# Patient Record
Sex: MALE | Race: WHITE | Employment: FULL TIME | URBAN - NONMETROPOLITAN AREA
[De-identification: names, ages, dates, MRNs, and addresses within clinical notes are randomized per-mention and may not be internally consistent; named-entity substitution may affect disease eponyms.]

---

## 2017-01-31 ENCOUNTER — OFFICE VISIT (OUTPATIENT)
Dept: PRIMARY CARE CLINIC | Age: 22
End: 2017-01-31
Payer: COMMERCIAL

## 2017-01-31 VITALS
SYSTOLIC BLOOD PRESSURE: 110 MMHG | WEIGHT: 222 LBS | BODY MASS INDEX: 30.07 KG/M2 | DIASTOLIC BLOOD PRESSURE: 72 MMHG | HEIGHT: 72 IN | TEMPERATURE: 98.6 F | HEART RATE: 61 BPM | OXYGEN SATURATION: 98 %

## 2017-01-31 DIAGNOSIS — R09.81 NASAL CONGESTION: ICD-10-CM

## 2017-01-31 DIAGNOSIS — R05.9 COUGH: ICD-10-CM

## 2017-01-31 DIAGNOSIS — J01.00 ACUTE MAXILLARY SINUSITIS, RECURRENCE NOT SPECIFIED: Primary | ICD-10-CM

## 2017-01-31 LAB
INFLUENZA A ANTIBODY: NORMAL
INFLUENZA B ANTIBODY: NORMAL

## 2017-01-31 PROCEDURE — 99213 OFFICE O/P EST LOW 20 MIN: CPT | Performed by: NURSE PRACTITIONER

## 2017-01-31 PROCEDURE — 87804 INFLUENZA ASSAY W/OPTIC: CPT | Performed by: NURSE PRACTITIONER

## 2017-01-31 RX ORDER — FLUTICASONE PROPIONATE 50 MCG
1 SPRAY, SUSPENSION (ML) NASAL DAILY
Qty: 1 BOTTLE | Refills: 0 | Status: SHIPPED | OUTPATIENT
Start: 2017-01-31 | End: 2020-01-07

## 2017-01-31 RX ORDER — AMOXICILLIN AND CLAVULANATE POTASSIUM 875; 125 MG/1; MG/1
1 TABLET, FILM COATED ORAL 2 TIMES DAILY
Qty: 20 TABLET | Refills: 0 | Status: SHIPPED | OUTPATIENT
Start: 2017-01-31 | End: 2017-02-10

## 2017-01-31 ASSESSMENT — ENCOUNTER SYMPTOMS
BACK PAIN: 0
ABDOMINAL DISTENTION: 0
VOICE CHANGE: 0
NAUSEA: 0
VOMITING: 0
DIARRHEA: 0
COUGH: 1
ABDOMINAL PAIN: 0
CHOKING: 0
WHEEZING: 0
FACIAL SWELLING: 1
CONSTIPATION: 0
SINUS PRESSURE: 1
TROUBLE SWALLOWING: 0
SORE THROAT: 1
SHORTNESS OF BREATH: 0
CHEST TIGHTNESS: 0

## 2017-03-14 RX ORDER — OMEPRAZOLE 40 MG/1
40 CAPSULE, DELAYED RELEASE ORAL DAILY
Qty: 90 CAPSULE | Refills: 2 | Status: SHIPPED | OUTPATIENT
Start: 2017-03-14 | End: 2017-11-23 | Stop reason: SDUPTHER

## 2017-03-14 RX ORDER — OMEPRAZOLE 40 MG/1
40 CAPSULE, DELAYED RELEASE ORAL DAILY
Qty: 90 CAPSULE | Refills: 2 | Status: SHIPPED | OUTPATIENT
Start: 2017-03-14 | End: 2017-03-14 | Stop reason: SDUPTHER

## 2017-10-03 ENCOUNTER — TELEPHONE (OUTPATIENT)
Dept: PRIMARY CARE CLINIC | Age: 22
End: 2017-10-03

## 2017-10-03 ENCOUNTER — PROCEDURE VISIT (OUTPATIENT)
Dept: PRIMARY CARE CLINIC | Age: 22
End: 2017-10-03
Payer: COMMERCIAL

## 2017-10-03 DIAGNOSIS — R79.89 LOW TESTOSTERONE LEVEL IN MALE: ICD-10-CM

## 2017-10-03 DIAGNOSIS — R79.89 LOW TESTOSTERONE LEVEL IN MALE: Primary | ICD-10-CM

## 2017-10-03 DIAGNOSIS — E29.1 HYPOGONADISM IN MALE: Primary | ICD-10-CM

## 2017-10-03 LAB — TESTOSTERONE TOTAL: 413 NG/DL (ref 249–836)

## 2017-10-03 PROCEDURE — 96372 THER/PROPH/DIAG INJ SC/IM: CPT | Performed by: PEDIATRICS

## 2017-10-03 RX ORDER — TESTOSTERONE CYPIONATE 200 MG/ML
200 INJECTION INTRAMUSCULAR ONCE
Status: COMPLETED | OUTPATIENT
Start: 2017-10-03 | End: 2017-10-03

## 2017-10-03 RX ORDER — TESTOSTERONE CYPIONATE 200 MG/ML
200 INJECTION INTRAMUSCULAR
Qty: 10 ML | Refills: 0 | Status: SHIPPED | OUTPATIENT
Start: 2017-10-03 | End: 2019-01-11 | Stop reason: SDUPTHER

## 2017-10-03 RX ADMIN — TESTOSTERONE CYPIONATE 200 MG: 200 INJECTION INTRAMUSCULAR at 09:25

## 2017-10-03 NOTE — PROGRESS NOTES
After obtaining consent, and per orders of Dr. Burnett , injection of 200mg testosterone given in Left upper quad. gluteus by Oleg Tabor. Patient tolerated well.

## 2017-10-03 NOTE — PROGRESS NOTES
Pt needs to have cbc, cmp and testosterone level checked.    This needs to be drawn as close to 8am as possible 1/2 way thru injections

## 2017-10-03 NOTE — TELEPHONE ENCOUNTER
Left message on pt voicemail and spoke with Gudelia Cook.  She said that pt had them drawn for work recently and that she would bring the results in when she come back for vacation

## 2017-10-03 NOTE — TELEPHONE ENCOUNTER
----- Message from GIANCARLO Interiano sent at 10/3/2017 12:59 PM CDT -----  Please call patient and let them know results. Continue current testosterone dosage. Need to have CBC and CMP drawn as well. Please check and see if these can be added  If not patient needs to return for a CBC and CMP.

## 2017-10-03 NOTE — TELEPHONE ENCOUNTER
Requested Prescriptions     Pending Prescriptions Disp Refills    testosterone cypionate (DEPOTESTOTERONE CYPIONATE) 200 MG/ML injection 10 mL 0     Sig: Inject 1 mL into the muscle every 30 days Please include syringes and needles

## 2017-11-27 RX ORDER — OMEPRAZOLE 40 MG/1
40 CAPSULE, DELAYED RELEASE ORAL DAILY
Qty: 90 CAPSULE | Refills: 3 | Status: SHIPPED | OUTPATIENT
Start: 2017-11-27 | End: 2018-06-08 | Stop reason: SDUPTHER

## 2017-11-27 NOTE — TELEPHONE ENCOUNTER
Pt last seen 1/31/17      Requested Prescriptions     Signed Prescriptions Disp Refills    omeprazole (PRILOSEC) 40 MG delayed release capsule 90 capsule 3     Sig: Take 1 capsule by mouth daily     Authorizing Provider: Frandy Russo     Ordering User: ALFREDO WILKERSON

## 2018-06-08 RX ORDER — OMEPRAZOLE 40 MG/1
40 CAPSULE, DELAYED RELEASE ORAL DAILY
Qty: 90 CAPSULE | Refills: 3 | Status: SHIPPED | OUTPATIENT
Start: 2018-06-08 | End: 2019-06-06 | Stop reason: SDUPTHER

## 2018-08-27 ENCOUNTER — PROCEDURE VISIT (OUTPATIENT)
Dept: PRIMARY CARE CLINIC | Age: 23
End: 2018-08-27
Payer: COMMERCIAL

## 2018-08-27 DIAGNOSIS — Z23 NEED FOR TETANUS BOOSTER: Primary | ICD-10-CM

## 2018-08-27 PROCEDURE — 90471 IMMUNIZATION ADMIN: CPT | Performed by: PEDIATRICS

## 2018-08-27 PROCEDURE — 90715 TDAP VACCINE 7 YRS/> IM: CPT | Performed by: PEDIATRICS

## 2019-01-03 DIAGNOSIS — E29.1 HYPOGONADISM IN MALE: Primary | ICD-10-CM

## 2019-01-09 LAB
TESTOSTERONE FREE: 9.3
TESTOSTERONE TOTAL: 432

## 2019-01-10 ENCOUNTER — TELEPHONE (OUTPATIENT)
Dept: PRIMARY CARE CLINIC | Age: 24
End: 2019-01-10

## 2019-01-11 DIAGNOSIS — R79.89 LOW TESTOSTERONE IN MALE: Primary | ICD-10-CM

## 2019-01-11 RX ORDER — TESTOSTERONE CYPIONATE 200 MG/ML
200 INJECTION INTRAMUSCULAR
Qty: 1 ML | Refills: 0 | Status: SHIPPED | OUTPATIENT
Start: 2019-01-11 | End: 2019-02-06 | Stop reason: SDUPTHER

## 2019-02-06 DIAGNOSIS — R79.89 LOW TESTOSTERONE IN MALE: ICD-10-CM

## 2019-02-06 RX ORDER — NITROFURANTOIN 25; 75 MG/1; MG/1
100 CAPSULE ORAL 2 TIMES DAILY
Qty: 14 CAPSULE | Refills: 0 | Status: SHIPPED | OUTPATIENT
Start: 2019-02-06 | End: 2019-02-13

## 2019-02-06 RX ORDER — TESTOSTERONE CYPIONATE 200 MG/ML
200 INJECTION INTRAMUSCULAR
Qty: 10 ML | Refills: 0 | Status: SHIPPED | OUTPATIENT
Start: 2019-02-06 | End: 2020-01-07

## 2019-02-07 RX ORDER — PHENAZOPYRIDINE HYDROCHLORIDE 200 MG/1
200 TABLET, FILM COATED ORAL 3 TIMES DAILY PRN
Qty: 12 TABLET | Refills: 0 | Status: SHIPPED | OUTPATIENT
Start: 2019-02-07 | End: 2019-02-11

## 2019-06-06 RX ORDER — OMEPRAZOLE 40 MG/1
40 CAPSULE, DELAYED RELEASE ORAL DAILY
Qty: 90 CAPSULE | Refills: 3 | Status: SHIPPED | OUTPATIENT
Start: 2019-06-06 | End: 2020-05-26

## 2019-09-06 ENCOUNTER — TELEPHONE (OUTPATIENT)
Dept: PRIMARY CARE CLINIC | Age: 24
End: 2019-09-06

## 2019-09-06 DIAGNOSIS — R79.89 LOW TESTOSTERONE IN MALE: Primary | ICD-10-CM

## 2019-09-06 DIAGNOSIS — E29.1 HYPOGONADISM IN MALE: ICD-10-CM

## 2019-09-07 ENCOUNTER — HOSPITAL ENCOUNTER (OUTPATIENT)
Dept: LAB | Age: 24
Discharge: HOME | End: 2019-09-07
Payer: COMMERCIAL

## 2019-09-07 DIAGNOSIS — R79.89 LOW TESTOSTERONE IN MALE: ICD-10-CM

## 2019-09-07 DIAGNOSIS — E29.1 HYPOGONADISM IN MALE: ICD-10-CM

## 2019-09-07 LAB — TESTOSTERONE TOTAL: 378.2 NG/DL (ref 249–836)

## 2019-09-10 ENCOUNTER — TELEPHONE (OUTPATIENT)
Dept: PRIMARY CARE CLINIC | Age: 24
End: 2019-09-10

## 2020-01-07 ENCOUNTER — OFFICE VISIT (OUTPATIENT)
Dept: PRIMARY CARE CLINIC | Age: 25
End: 2020-01-07
Payer: COMMERCIAL

## 2020-01-07 VITALS
HEART RATE: 62 BPM | HEIGHT: 72 IN | DIASTOLIC BLOOD PRESSURE: 82 MMHG | TEMPERATURE: 98.6 F | SYSTOLIC BLOOD PRESSURE: 124 MMHG | OXYGEN SATURATION: 98 % | WEIGHT: 172 LBS | BODY MASS INDEX: 23.3 KG/M2

## 2020-01-07 PROCEDURE — 99213 OFFICE O/P EST LOW 20 MIN: CPT | Performed by: NURSE PRACTITIONER

## 2020-01-07 RX ORDER — TESTOSTERONE CYPIONATE 200 MG/ML
200 INJECTION INTRAMUSCULAR
Qty: 1 ML | Refills: 5 | Status: SHIPPED | OUTPATIENT
Start: 2020-01-07 | End: 2020-07-13

## 2020-01-07 ASSESSMENT — ENCOUNTER SYMPTOMS
SORE THROAT: 0
WHEEZING: 0
RHINORRHEA: 0
SHORTNESS OF BREATH: 0
ABDOMINAL PAIN: 0
BACK PAIN: 0
COUGH: 0
EYES NEGATIVE: 1

## 2020-01-07 NOTE — PATIENT INSTRUCTIONS
seek immediate medical care if:    · You have headaches.     · You have problems with your vision.    Watch closely for changes in your health, and be sure to contact your doctor if:    · You have trouble getting or keeping an erection.     · You have a loss of body hair.     · You feel weak or tired a lot of the time.     · Your breasts are getting larger.     · You do not get better as expected. Where can you learn more? Go to https://ADVENTRX PharmaceuticalspeniraliPicPrizeseb.Grivy. org and sign in to your JustGo account. Enter [id number] in the Gasngo box to learn more about \"Hypogonadism: Care Instructions. \"     If you do not have an account, please click on the \"Sign Up Now\" link. Current as of: November 6, 2018  Content Version: 12.1  © 2298-7524 Healthwise, Incorporated. Care instructions adapted under license by [facility] ([facility]). If you have questions about a medical condition or this instruction, always ask your healthcare professional. [place] [address] any warranty or liability for your use of this information.

## 2020-01-07 NOTE — PROGRESS NOTES
[address]  [place], [address] [place] [address] Rd  Phone [phone]   Fax [phone]      OFFICE VISIT: 2020    Ajay Michaeliff- : 1995      Reason For Visit:  Huong Santoro is a 21 y.o. Lily Dock is here for Referral - General         Health Maintenance    HPI      Patient is here for follow up on low testosterone  He reports he has been trying to get a baby  Has an issue with low testosterone  And he is concerned   He hasnt been getting his shots    He is worried about taking it   And worried about checking it  As reason       height is 6' (1.829 m) and weight is 172 lb (78 kg). His temporal temperature is 98.6 °F (37 °C). His blood pressure is 124/82 and his pulse is 62. His oxygen saturation is 98%. Body mass index is 23.33 kg/m². Results for orders placed or performed in visit on 19   Testosterone   Result Value Ref Range    Testosterone 378.2 249.0 - 836.0 ng/dL       I have reviewed the following with the Mr. Aguilar Knee   Lab Review   Orders Only on 2019   Component Date Value    Testosterone 2019 378.2      Copies of these are in the chart. Prior to Visit Medications    Medication Sig Taking? Authorizing Provider   omeprazole (PRILOSEC) 40 MG delayed release capsule Take 1 capsule by mouth daily Yes GIANCARLO Kumari       Allergies: Percocet [oxycodone-acetaminophen]    Past Medical History:   Diagnosis Date    Asthma     Has outgrown it    Vision abnormalities     wears glasses/contacts       Past Surgical History:   Procedure Laterality Date    CYST REMOVAL      on the facial area    SHOULDER SURGERY      Labrum repair    TYMPANOSTOMY TUBE PLACEMENT         Social History     Tobacco Use    Smoking status: Never Smoker    Smokeless tobacco: Former User   Substance Use Topics    Alcohol use: Yes     Comment: rare       Review of Systems   Constitutional: Positive for activity change and fatigue. Negative for appetite change and fever.    HENT: Negative for Octavia Phillip, UrologySilvio      Orders Placed This Encounter   Procedures   [facility] Octavia Galeas, UrologyAtilio        Return if symptoms worsen or fail to improve. Patient Instructions       Patient Education        Hypogonadism: Care Instructions  Your Care Instructions    Men who have hypogonadism do not make enough testosterone. This hormone allows men to make sperm and to have normal physical male traits. The condition also is known as testosterone deficiency. It can lead to loss of sex drive, weakness, impotence, infertility, and weakened bones. Many things can cause this condition. Causes include injured testicles, certain medicines, an infection, and aging. Having a long-term health problem such as kidney or liver disease or being obese can cause it. So can surgery or radiation treatment for another health problem. It also can be present at birth. It is most often treated with testosterone hormone. You can get the hormone as a shot or through a patch or gel on the skin. Follow-up care is a key part of your treatment and safety. Be sure to make and go to all appointments, and call your doctor if you are having problems. It's also a good idea to know your test results and keep a list of the medicines you take. How can you care for yourself at home? · Take your medicines exactly as prescribed. Call your doctor if you think you are having a problem with your medicine. You will get more details on the specific medicines your doctor prescribes. · Follow your treatment plan. If you use testosterone hormones, follow your doctor's instructions. Hormones can help relieve many of the effects of this condition, such as impotence. But it may take weeks or months for your symptoms to improve. · Get plenty of exercise. And make sure to get plenty of calcium and vitamin D in your diet. Eat more dairy foods and green vegetables. They can help keep your bones from getting weak.   · If you have a hard time dealing with this condition, talk to your doctor about joining a support group. Talking with others who have the same problems can help you cope. When should you call for help? Call your doctor now or seek immediate medical care if:    · You have headaches.     · You have problems with your vision.    Watch closely for changes in your health, and be sure to contact your doctor if:    · You have trouble getting or keeping an erection.     · You have a loss of body hair.     · You feel weak or tired a lot of the time.     · Your breasts are getting larger.     · You do not get better as expected. Where can you learn more? Go to https://GlucoVistapeMumart.Crumpet Cashmere. org and sign in to your [employer] account. Enter [id number] in the Yelp box to learn more about \"Hypogonadism: Care Instructions. \"     If you do not have an account, please click on the \"Sign Up Now\" link. Current as of: November 6, 2018  Content Version: 12.1  © 4336-1090 Healthwise, Incorporated. Care instructions adapted under license by [facility] ([facility]). If you have questions about a medical condition or this instruction, always ask your healthcare professional. [address] any warranty or liability for your use of this information. Controlled Substances Monitoring: Additional Instructions: As always, patient is advisedto bring in medication bottles in order to correctly reconcile with our current list.      Ralph Jorge received counseling on the following healthy behaviors: none    Patient giveneducational materials on plan of care    I have instructed Ralph Jorge to complete a self tracking handout on none and instructed them to bring it with them to his next appointment. Discussed use, benefit, and side effects of prescribed medications. Barriers to medication compliance addressed. All patient questions answered. Pt voiced understanding.      GIANCARLO Granda

## 2020-01-23 ENCOUNTER — OFFICE VISIT (OUTPATIENT)
Dept: UROLOGY | Age: 25
End: 2020-01-23
Payer: COMMERCIAL

## 2020-01-23 ENCOUNTER — TRANSCRIBE ORDERS (OUTPATIENT)
Dept: ADMINISTRATIVE | Facility: HOSPITAL | Age: 25
End: 2020-01-23

## 2020-01-23 VITALS
BODY MASS INDEX: 23.8 KG/M2 | HEART RATE: 59 BPM | HEIGHT: 71 IN | SYSTOLIC BLOOD PRESSURE: 143 MMHG | WEIGHT: 170 LBS | TEMPERATURE: 98.1 F | DIASTOLIC BLOOD PRESSURE: 81 MMHG

## 2020-01-23 DIAGNOSIS — Z87.42 HISTORY OF INFERTILITY: Primary | ICD-10-CM

## 2020-01-23 PROCEDURE — 99212 OFFICE O/P EST SF 10 MIN: CPT | Performed by: PHYSICIAN ASSISTANT

## 2020-01-23 ASSESSMENT — ENCOUNTER SYMPTOMS
DIARRHEA: 0
SHORTNESS OF BREATH: 0
WHEEZING: 0
CONSTIPATION: 0
EYE REDNESS: 0
ABDOMINAL PAIN: 0
BACK PAIN: 0
EYE DISCHARGE: 0
COUGH: 0

## 2020-01-23 NOTE — PROGRESS NOTES
Garret Mccormick is a 21 y.o. male who presents today   Chief Complaint   Patient presents with    New Patient     I am here through referral for hypogonadism      HPI:  Patient is a 79-year-old gentleman referred for hypopneic gonad is him and also infertility. Patient had been on IM testosterone replacement for several years he had a period about 9 months where he went off testosterone replacement and increased his exercise and lost some weight and his natural testosterone did increase. However more recently his testosterone levels have dropped and he is back on testosterone replacement. He is they have been trying to conceive for about 10 months without success. Patient does understand the relationship between testosterone replacement and infertility or low sperm count. Past Medical History:   Diagnosis Date    Asthma     Has outgrown it    Vision abnormalities     wears glasses/contacts       Past Surgical History:   Procedure Laterality Date    CYST REMOVAL      on the facial area    SHOULDER SURGERY      Labrum repair    TYMPANOSTOMY TUBE PLACEMENT  1995       Current Outpatient Medications   Medication Sig Dispense Refill    testosterone cypionate (DEPOTESTOTERONE CYPIONATE) 200 MG/ML injection Inject 1 mL into the muscle every 30 days for 30 doses. 1 mL 5    omeprazole (PRILOSEC) 40 MG delayed release capsule Take 1 capsule by mouth daily 90 capsule 3     No current facility-administered medications for this visit.         Allergies   Allergen Reactions    Percocet [Oxycodone-Acetaminophen] Nausea And Vomiting          Social History     Socioeconomic History    Marital status:      Spouse name: None    Number of children: None    Years of education: None    Highest education level: None   Occupational History    None   Social Needs    Financial resource strain: None    Food insecurity:     Worry: None     Inability: None    Transportation needs:     Medical: None Non-medical: None   Tobacco Use    Smoking status: Never Smoker    Smokeless tobacco: Former User   Substance and Sexual Activity    Alcohol use: Yes     Comment: rare    Drug use: No    Sexual activity: None   Lifestyle    Physical activity:     Days per week: None     Minutes per session: None    Stress: None   Relationships    Social connections:     Talks on phone: None     Gets together: None     Attends Holiness service: None     Active member of club or organization: None     Attends meetings of clubs or organizations: None     Relationship status: None    Intimate partner violence:     Fear of current or ex partner: None     Emotionally abused: None     Physically abused: None     Forced sexual activity: None   Other Topics Concern    None   Social History Narrative    None       Family History   Problem Relation Age of Onset    High Blood Pressure Mother     High Blood Pressure Maternal Grandmother     Diabetes Maternal Grandmother     High Cholesterol Maternal Grandfather     High Blood Pressure Maternal Grandfather     Diabetes Maternal Grandfather     Cancer Paternal Grandmother         Pancreatic     High Blood Pressure Paternal Grandmother        REVIEW OF SYSTEMS:  Review of Systems   Constitutional: Negative for chills and fever. HENT: Negative for congestion and hearing loss. Eyes: Negative for discharge and redness. Respiratory: Negative for cough, shortness of breath and wheezing. Cardiovascular: Negative for leg swelling. Gastrointestinal: Negative for abdominal pain, constipation and diarrhea. Endocrine: Negative for cold intolerance and heat intolerance. Genitourinary: Negative for decreased urine volume, difficulty urinating, dysuria, enuresis, flank pain, frequency, genital sores, hematuria and urgency. Musculoskeletal: Negative for back pain and gait problem. Skin: Negative for rash. Allergic/Immunologic: Negative for environmental allergies. Neurological: Negative for dizziness, weakness and headaches. Hematological: Does not bruise/bleed easily. Psychiatric/Behavioral: Negative for behavioral problems, confusion and sleep disturbance. PHYSICAL EXAM:  BP (!) 143/81   Pulse 59   Temp 98.1 °F (36.7 °C) (Temporal)   Ht 5' 11\" (1.803 m)   Wt 170 lb (77.1 kg)   BMI 23.71 kg/m²   Physical Exam  Vitals signs and nursing note reviewed. Constitutional:       Appearance: Normal appearance. HENT:      Head: Normocephalic. Nose: Nose normal.   Eyes:      Conjunctiva/sclera: Conjunctivae normal.   Cardiovascular:      Pulses: Normal pulses. Pulmonary:      Effort: Pulmonary effort is normal.   Genitourinary:     Penis: Normal.        Neurological:      Mental Status: He is alert and oriented to person, place, and time. Psychiatric:         Mood and Affect: Mood normal.         Behavior: Behavior normal.                       1. Hypogonadism in male  Has history of hypogonadism for several years has been on IM testosterone for now this has been stopped due to it causing infertility.  - Ultrasound scrotum and testicles; Future    2. History of infertility  Patient states they have been trying to conceive for approximately 10 months without success I did explain the relationship between testosterone replacement and risk of low sperm count. In some cases sperm count can increase after stopping testosterone replacement however in some cases it is irreversible. I explained that a semen analysis needs to be done I ordered but was told the lab no longer does the semen analysis I had explained to the patient that he would need to be referred to infertility for further evaluation we will make referral to infertility clinic in [place]. I would like him to return with a scrotal ultrasound just to rule out any anatomical abnormalities. - SEMEN ANALYSIS; Future  - Ultrasound scrotum and testicles;  Future      Orders Placed This Encounter Procedures    Ultrasound scrotum and testicles     Standing Status:   Future     Standing Expiration Date:   1/23/2021     Order Specific Question:   Reason for exam:     Answer:   History of infertility hypogonadism     No orders of the defined types were placed in this encounter.       Plan:  Follow up in 2 weeks with scrotal US

## 2020-01-27 ENCOUNTER — APPOINTMENT (OUTPATIENT)
Dept: LAB | Facility: HOSPITAL | Age: 25
End: 2020-01-27

## 2020-01-27 ENCOUNTER — TELEPHONE (OUTPATIENT)
Dept: UROLOGY | Age: 25
End: 2020-01-27

## 2020-01-27 LAB
CHARACTER SMN: NORMAL
COLLECTION METHOD SMN: NORMAL
EPI CELLS #/AREA SMN HPF: NORMAL /HPF
LIQUEFACTION TIME SMN: NORMAL MIN
NON PROGRESSIVE MOTILITY: 7.4 %
RBC #/AREA SMN HPF: NORMAL /HPF
SEX ABSTIN DURATION TIME PATIENT: 15 DAYS
SLOW PROGRESSIVE MOTILITY: 25.1 %
SMI: 381 (ref 80–400)
SPEC CONTAINER SPEC: NORMAL
SPECIMEN VOL SMN: 3.3 ML
SPERM # SMN: 178.7 MILLIONS/ML
SPERM IMMOTILE NFR SMN: 39.7 %
SPERM MOTILE NFR SMN: 60.3 % MOTILE (ref 50–100)
SPERM PROG NFR SMN: 27.8 % (ref 25–100)
SPERM SMN: 19.7 % NORMAL (ref 15–100)
VISC SMN: NORMAL CP
WBC SMN QL: NORMAL /HPF
WHO STANDARD: NORMAL

## 2020-01-27 PROCEDURE — 89320 SEMEN ANAL VOL/COUNT/MOT: CPT | Performed by: PHYSICIAN ASSISTANT

## 2020-01-27 NOTE — TELEPHONE ENCOUNTER
Called clinic for referral. Pt does not need a referral. He is to have his wife to call for appt so both can been go. Called pt. He is going to [address] [place] [address] lab for sperm count. He will come back for his follow up and go from there.

## 2020-02-06 ENCOUNTER — OFFICE VISIT (OUTPATIENT)
Dept: UROLOGY | Age: 25
End: 2020-02-06
Payer: COMMERCIAL

## 2020-02-06 ENCOUNTER — HOSPITAL ENCOUNTER (OUTPATIENT)
Dept: ULTRASOUND IMAGING | Age: 25
Discharge: HOME | End: 2020-02-06
Payer: COMMERCIAL

## 2020-02-06 VITALS
BODY MASS INDEX: 23.52 KG/M2 | DIASTOLIC BLOOD PRESSURE: 71 MMHG | WEIGHT: 168 LBS | SYSTOLIC BLOOD PRESSURE: 129 MMHG | HEART RATE: 60 BPM | HEIGHT: 71 IN | TEMPERATURE: 97.9 F

## 2020-02-06 LAB
APPEARANCE FLUID: CLEAR
BILIRUBIN, POC: NORMAL
BLOOD URINE, POC: NORMAL
CLARITY, POC: CLEAR
COLOR, POC: YELLOW
GLUCOSE URINE, POC: NORMAL
KETONES, POC: NORMAL
LEUKOCYTE EST, POC: NORMAL
NITRITE, POC: NORMAL
PH, POC: 7.5
PROTEIN, POC: NORMAL
SPECIFIC GRAVITY, POC: 1.02
UROBILINOGEN, POC: 0.2

## 2020-02-06 PROCEDURE — 76870 US EXAM SCROTUM: CPT

## 2020-02-06 PROCEDURE — 99213 OFFICE O/P EST LOW 20 MIN: CPT | Performed by: PHYSICIAN ASSISTANT

## 2020-02-06 PROCEDURE — 81002 URINALYSIS NONAUTO W/O SCOPE: CPT | Performed by: PHYSICIAN ASSISTANT

## 2020-02-06 ASSESSMENT — ENCOUNTER SYMPTOMS
CONSTIPATION: 0
EYE REDNESS: 0
COUGH: 0
ABDOMINAL PAIN: 0
EYE DISCHARGE: 0
SHORTNESS OF BREATH: 0
DIARRHEA: 0
WHEEZING: 0
BACK PAIN: 0

## 2020-02-06 NOTE — PROGRESS NOTES
gait problem. Skin: Negative for rash. Allergic/Immunologic: Negative for environmental allergies. Neurological: Negative for dizziness, weakness and headaches. Hematological: Does not bruise/bleed easily. Psychiatric/Behavioral: Negative for behavioral problems, confusion and sleep disturbance. I have reviewed and agree with the documentation provided by the medical assistant for this patient for today's visit. PHYSICAL EXAM:  /71   Pulse 60   Temp 97.9 °F (36.6 °C) (Temporal)   Ht 5' 11\" (1.803 m)   Wt 168 lb (76.2 kg)   BMI 23.43 kg/m²   Physical Exam  Vitals signs and nursing note reviewed. Constitutional:       Appearance: Normal appearance. HENT:      Head: Normocephalic and atraumatic. Nose: Nose normal.   Eyes:      Conjunctiva/sclera: Conjunctivae normal.   Cardiovascular:      Rate and Rhythm: Normal rate. Pulmonary:      Effort: Pulmonary effort is normal.   Neurological:      Mental Status: He is alert and oriented to person, place, and time. Psychiatric:         Mood and Affect: Mood normal.         Behavior: Behavior normal.             DATA:  U/A:    Lab Results   Component Value Date    NITRITE neg 02/06/2020    COLORU yellow 02/06/2020    PROTEINU neg 02/06/2020    PHUR 7.5 02/06/2020    CLARITYU clear 02/06/2020    SPECGRAV 1.020 02/06/2020    LEUKOCYTESUR neg 02/06/2020    BILIRUBINUR neg 02/06/2020    BLOODU neg 02/06/2020    GLUCOSEU neg 02/06/2020          Imaging:  Normal testes with a normal blood flow. No intrinsic   abnormality. A small left epididymal cyst.   Small bilateral hydrocele. A left varicocele within negative Valsalva. Signed by Dr Amena Ochoa on 2/6/2020 3:48 PM         1. Male infertility, unspecified  I reviewed patient's semen analysis done at [facility].  His sperm count is well within normal range there is 60% motility. There is below normal morphology.   Scrotal ultrasound reveals minor findings does reveal a left varicocele which is small however given the semen analysis findings unlikely this is the source of the infertility. After discussion we will refer patient to fertility specialist in [place] feel both himself and his wife need to be further evaluated. - POCT Urinalysis no Micro      Orders Placed This Encounter   Procedures    POCT Urinalysis no Micro     No orders of the defined types were placed in this encounter. Plan:  Patient will be referred to fertility specialist in [place].

## 2020-05-26 RX ORDER — OMEPRAZOLE 40 MG/1
40 CAPSULE, DELAYED RELEASE ORAL DAILY
Qty: 90 CAPSULE | Refills: 1 | Status: SHIPPED | OUTPATIENT
Start: 2020-05-26 | End: 2020-11-30

## 2020-07-13 RX ORDER — TESTOSTERONE CYPIONATE 200 MG/ML
200 INJECTION INTRAMUSCULAR
Qty: 1 ML | Refills: 5 | Status: SHIPPED | OUTPATIENT
Start: 2020-07-13 | End: 2021-04-29 | Stop reason: SDUPTHER

## 2020-11-10 PROCEDURE — 90471 IMMUNIZATION ADMIN: CPT | Performed by: NURSE PRACTITIONER

## 2020-11-10 PROCEDURE — 90715 TDAP VACCINE 7 YRS/> IM: CPT | Performed by: NURSE PRACTITIONER

## 2020-11-30 RX ORDER — OMEPRAZOLE 40 MG/1
CAPSULE, DELAYED RELEASE ORAL
Qty: 90 CAPSULE | Refills: 1 | Status: SHIPPED | OUTPATIENT
Start: 2020-11-30 | End: 2021-05-24

## 2020-11-30 NOTE — TELEPHONE ENCOUNTER
Received fax from pharmacy requesting refill on pts medication(s). Pt was last seen in office on 1/7/2020  and has a follow up scheduled for Visit date not found. Will send request to  Cassie Maharaj  for authorization.      Requested Prescriptions     Pending Prescriptions Disp Refills    omeprazole (PRILOSEC) 40 MG delayed release capsule [Pharmacy Med Name: OMEPRAZOLE DR 40 MG CAPSULE] 90 capsule 1     Sig: TAKE 1 CAPSULE BY MOUTH EVERY DAY

## 2021-03-02 DIAGNOSIS — R30.0 DYSURIA: Primary | ICD-10-CM

## 2021-03-02 RX ORDER — TAMSULOSIN HYDROCHLORIDE 0.4 MG/1
0.4 CAPSULE ORAL DAILY
Qty: 30 CAPSULE | Refills: 5 | Status: SHIPPED | OUTPATIENT
Start: 2021-03-02 | End: 2021-08-25

## 2021-03-02 RX ORDER — PHENAZOPYRIDINE HYDROCHLORIDE 200 MG/1
200 TABLET, FILM COATED ORAL 3 TIMES DAILY PRN
Qty: 9 TABLET | Refills: 0 | Status: SHIPPED | OUTPATIENT
Start: 2021-03-02 | End: 2021-03-05

## 2021-03-23 DIAGNOSIS — E29.1 HYPOGONADISM IN MALE: Primary | ICD-10-CM

## 2021-03-23 DIAGNOSIS — E29.1 HYPOGONADISM IN MALE: ICD-10-CM

## 2021-03-26 ENCOUNTER — TELEPHONE (OUTPATIENT)
Dept: PRIMARY CARE CLINIC | Age: 26
End: 2021-03-26

## 2021-03-26 LAB
SEX HORMONE BINDING GLOBULIN: 43 NMOL/L (ref 11–80)
TESTOSTERONE FREE-NONMALE: 87 PG/ML (ref 47–244)
TESTOSTERONE TOTAL: 491 NG/DL (ref 220–1000)

## 2021-03-26 NOTE — TELEPHONE ENCOUNTER
Called patient and left message with provider instructions on voicmail. Should patient have any questions or concerns, patient is to contact office at [phone].

## 2021-03-26 NOTE — TELEPHONE ENCOUNTER
----- Message from GIANCARLO Coto sent at 3/26/2021  3:26 PM CDT -----  Please call patient and let them know results.    Normal testosterone

## 2021-03-31 ENCOUNTER — TELEPHONE (OUTPATIENT)
Dept: PRIMARY CARE CLINIC | Age: 26
End: 2021-03-31

## 2021-03-31 DIAGNOSIS — R68.89 COLD FEELING: ICD-10-CM

## 2021-03-31 DIAGNOSIS — K59.00 CONSTIPATION, UNSPECIFIED CONSTIPATION TYPE: ICD-10-CM

## 2021-03-31 DIAGNOSIS — R53.83 FATIGUE, UNSPECIFIED TYPE: ICD-10-CM

## 2021-03-31 DIAGNOSIS — R53.83 FATIGUE, UNSPECIFIED TYPE: Primary | ICD-10-CM

## 2021-03-31 LAB
T4 FREE: 1.37 NG/DL (ref 0.93–1.7)
TSH SERPL DL<=0.05 MIU/L-ACNC: 1.56 UIU/ML (ref 0.27–4.2)

## 2021-04-01 ENCOUNTER — TELEPHONE (OUTPATIENT)
Dept: PRIMARY CARE CLINIC | Age: 26
End: 2021-04-01

## 2021-04-01 NOTE — TELEPHONE ENCOUNTER
Called patient, spoke with: Patient regarding the results of the patients most recent labs. I advised Patient of Juan F Mariel recommendations.    Patient did voice understanding

## 2021-04-01 NOTE — TELEPHONE ENCOUNTER
----- Message from GIANCARLO Crenshaw sent at 4/1/2021  7:00 AM CDT -----  Please call patient and let them know results.    Normal thyroid

## 2021-04-29 DIAGNOSIS — E29.1 HYPOGONADISM IN MALE: ICD-10-CM

## 2021-04-29 RX ORDER — TESTOSTERONE CYPIONATE 200 MG/ML
200 INJECTION INTRAMUSCULAR
Qty: 1 ML | Refills: 1 | Status: SHIPPED | OUTPATIENT
Start: 2021-04-29 | End: 2021-09-13 | Stop reason: SDUPTHER

## 2021-05-24 RX ORDER — OMEPRAZOLE 40 MG/1
40 CAPSULE, DELAYED RELEASE ORAL DAILY
Qty: 30 CAPSULE | Refills: 0 | Status: SHIPPED | OUTPATIENT
Start: 2021-05-24 | End: 2021-06-23

## 2021-06-23 RX ORDER — OMEPRAZOLE 40 MG/1
40 CAPSULE, DELAYED RELEASE ORAL DAILY
Qty: 30 CAPSULE | Refills: 0 | Status: SHIPPED | OUTPATIENT
Start: 2021-06-23 | End: 2021-07-27 | Stop reason: SDUPTHER

## 2021-06-23 NOTE — TELEPHONE ENCOUNTER
Received fax from pharmacy requesting refill on pts medication(s). Pt was last seen in office on 1/7/2020  and has a follow up scheduled for Visit date not found. Will send request to  Chikis Dejesus  for patient.      Requested Prescriptions     Pending Prescriptions Disp Refills    omeprazole (PRILOSEC) 40 MG delayed release capsule [Pharmacy Med Name: OMEPRAZOLE DR 40 MG CAPSULE] 30 capsule 0     Sig: TAKE 1 CAPSULE BY MOUTH DAILY (NEEDS A FOLLOW UP BEFORE FURTHER REFILLS)

## 2021-07-27 RX ORDER — OMEPRAZOLE 40 MG/1
40 CAPSULE, DELAYED RELEASE ORAL DAILY
Qty: 90 CAPSULE | Refills: 3 | Status: SHIPPED | OUTPATIENT
Start: 2021-07-27 | End: 2022-08-09

## 2021-08-24 DIAGNOSIS — R30.0 DYSURIA: ICD-10-CM

## 2021-08-25 RX ORDER — TAMSULOSIN HYDROCHLORIDE 0.4 MG/1
0.4 CAPSULE ORAL DAILY
Qty: 90 CAPSULE | Refills: 1 | Status: SHIPPED | OUTPATIENT
Start: 2021-08-25

## 2021-08-25 NOTE — TELEPHONE ENCOUNTER
Received fax from pharmacy requesting refill on pts medication(s). Pt was last seen in office on 1/7/2020  and has a follow up scheduled for Visit date not found. Will send request to  Ca Walton  for patient.      Requested Prescriptions     Pending Prescriptions Disp Refills    tamsulosin (FLOMAX) 0.4 MG capsule [Pharmacy Med Name: TAMSULOSIN HCL 0.4 MG CAPSULE] 90 capsule 1     Sig: TAKE 1 CAPSULE BY MOUTH EVERY DAY Wound Care (No Sutures): Petrolatum

## 2021-09-13 DIAGNOSIS — E29.1 HYPOGONADISM IN MALE: ICD-10-CM

## 2021-09-13 RX ORDER — TESTOSTERONE CYPIONATE 200 MG/ML
200 INJECTION INTRAMUSCULAR
Qty: 1 ML | Refills: 3 | Status: SHIPPED | OUTPATIENT
Start: 2021-09-13 | End: 2022-03-11 | Stop reason: SDUPTHER

## 2021-09-13 NOTE — TELEPHONE ENCOUNTER
Received fax from pharmacy requesting refill on pts medication(s). Pt was last seen in office on 1/7/2020  and has a follow up scheduled for Visit date not found. Will send request to  Dr. Alejandrina Corbin  for authorization. Requested Prescriptions     Pending Prescriptions Disp Refills    testosterone cypionate (DEPOTESTOTERONE CYPIONATE) 200 MG/ML injection 1 mL 3     Sig: Inject 1 mL into the muscle every 30 days for 6 doses.

## 2021-09-24 DIAGNOSIS — E29.1 HYPOGONADISM IN MALE: ICD-10-CM

## 2022-01-28 ENCOUNTER — TELEPHONE (OUTPATIENT)
Dept: PRIMARY CARE CLINIC | Age: 27
End: 2022-01-28

## 2022-01-28 NOTE — TELEPHONE ENCOUNTER
[facility] called, insurance will no longer cover his Prilosec without PA. Ins only covers a 90 day supply per a year.

## 2022-02-09 ENCOUNTER — TELEPHONE (OUTPATIENT)
Dept: PRIMARY CARE CLINIC | Age: 27
End: 2022-02-09

## 2022-02-09 DIAGNOSIS — E29.1 HYPOGONADISM IN MALE: Primary | ICD-10-CM

## 2022-03-09 DIAGNOSIS — E29.1 HYPOGONADISM IN MALE: ICD-10-CM

## 2022-03-09 LAB
TESTOSTERONE FREE: 5.6
TESTOSTERONE TOTAL: 252

## 2022-03-11 ENCOUNTER — TELEPHONE (OUTPATIENT)
Dept: PRIMARY CARE CLINIC | Age: 27
End: 2022-03-11

## 2022-03-11 DIAGNOSIS — E29.1 HYPOGONADISM IN MALE: ICD-10-CM

## 2022-03-11 RX ORDER — TESTOSTERONE CYPIONATE 200 MG/ML
200 INJECTION INTRAMUSCULAR
Qty: 2 ML | Refills: 3 | Status: SHIPPED | OUTPATIENT
Start: 2022-03-11 | End: 2022-07-18

## 2022-03-14 ENCOUNTER — TELEMEDICINE (OUTPATIENT)
Dept: PRIMARY CARE CLINIC | Age: 27
End: 2022-03-14
Payer: COMMERCIAL

## 2022-03-14 DIAGNOSIS — R79.89 LOW TESTOSTERONE IN MALE: Primary | ICD-10-CM

## 2022-03-14 PROCEDURE — 99442 PR PHYS/QHP TELEPHONE EVALUATION 11-20 MIN: CPT | Performed by: PEDIATRICS

## 2022-03-14 ASSESSMENT — ENCOUNTER SYMPTOMS
RESPIRATORY NEGATIVE: 1
ALLERGIC/IMMUNOLOGIC NEGATIVE: 1
EYES NEGATIVE: 1
GASTROINTESTINAL NEGATIVE: 1

## 2022-03-14 NOTE — PROGRESS NOTES
[address] [facility], [address]  Phone [phone]   Fax [phone]      OFFICE VISIT: 3/14/2022    Evantre Delatorre Joe-: 1995      [facility] [place]  Reason For Visit:  Rudy Gramajo is a 28 y.o. No chief complaint on file. Patient presents via telephone conference on follow-up for low testosterone. He did have blood work drawn this past week. This was lower than the desired value. He does still have symptoms of fatigue. We are going to increase his dose to 1 mL (200 mg) every 2 weeks. Recheck testosterone in 3 to 6 months     vitals were not taken for this visit. There is no height or weight on file to calculate BMI. I have reviewed the following with the Mr. Sharon Diaz   Lab Review  Orders Only on 2022   Component Date Value    Testosterone, Free 2022 5.6     Testosterone 2022 252      Copies of these are in the chart. Current Outpatient Medications   Medication Sig Dispense Refill    testosterone cypionate (DEPOTESTOTERONE CYPIONATE) 200 MG/ML injection Inject 1 mL into the muscle every 14 days for 2 doses. 2 mL 3    tamsulosin (FLOMAX) 0.4 MG capsule Take 1 capsule by mouth daily 90 capsule 1    omeprazole (PRILOSEC) 40 MG delayed release capsule Take 1 capsule by mouth daily (needs a follow up before further refills) 90 capsule 3     No current facility-administered medications for this visit.        Allergies: Percocet [oxycodone-acetaminophen]     Past Medical History:   Diagnosis Date    Asthma     Has outgrown it    Vision abnormalities     wears glasses/contacts       Family History   Problem Relation Age of Onset    High Blood Pressure Mother     High Blood Pressure Maternal Grandmother     Diabetes Maternal Grandmother     High Cholesterol Maternal Grandfather     High Blood Pressure Maternal Grandfather     Diabetes Maternal Grandfather     Cancer Paternal Grandmother         Pancreatic     High Blood Pressure Paternal Grandmother Past Surgical History:   Procedure Laterality Date    CYST REMOVAL      on the facial area    SHOULDER SURGERY      Labrum repair    TYMPANOSTOMY TUBE PLACEMENT  1995       Social History     Tobacco Use    Smoking status: Never Smoker    Smokeless tobacco: Former User   Substance Use Topics    Alcohol use: Yes     Comment: rare        Review of Systems   Constitutional: Positive for fatigue. HENT: Negative. Eyes: Negative. Respiratory: Negative. Cardiovascular: Negative. Gastrointestinal: Negative. Endocrine: Negative. Genitourinary: Negative. Musculoskeletal: Negative. Skin: Negative. Allergic/Immunologic: Negative. Neurological: Negative. Hematological: Negative. Psychiatric/Behavioral: Negative. Physical Exam  Physical exam was not performed today as this was a telephone conference visit      ASSESSMENT      ICD-10-CM    1. Low testosterone in male  R74.80 Testosterone, free, total         PLAN    1. Low testosterone in male  We are going to increase his testosterone to 1 mL (200 mg) every 2 weeks. Recheck testosterone in 3 to 6 months      Orders Placed This Encounter   Procedures    Testosterone, free, total        Return in about 6 months (around 9/14/2022) for 15. Due to patients co-morbidities and risk for potential exposure of COVID 19 pandemic. Patient was contacted and agreed to proceed with a telephone virtual visit. The risks and benefits of converting to a telephone virtual visit were discussed in light of the current infectious disease epidemic. Patient also understood that insurance coverage and co-pays are up to their individual insurance plans. Provider performed history of present illness and review of systems. Diagnosis and treatment plan was discussed with patient. Pharmacy of choice was reviewed along with past medical history, medication allergies, and current medications.  Education provided to patient or patient parents/guardian with current illness diagnosis as well as when to seek additional healthcare due to changing or for worsening symptoms. Patient voiced understanding. 20 minutes were spent on the phone with patientEsa Bar, was evaluated through a synchronous (real-time) audio-video encounter. The patient (or guardian if applicable) is aware that this is a billable service, which includes applicable co-pays. This Virtual Visit was conducted with patient's (and/or legal guardian's) consent. The visit was conducted pursuant to the emergency declaration under the [address] [place] [address], [address] [place] [address] authority and the [employer] Act. Patient identification was verified, and a caregiver was present when appropriate. The patient was located at home in a state where the provider was licensed to provide care. Total time spent for this encounter: 20m    --MALICK Alexander DO on 3/14/2022 at 7:27 PM    An electronic signature was used to authenticate this note.

## 2022-07-17 DIAGNOSIS — E29.1 HYPOGONADISM IN MALE: ICD-10-CM

## 2022-07-18 RX ORDER — TESTOSTERONE CYPIONATE 200 MG/ML
200 INJECTION INTRAMUSCULAR
Qty: 2 ML | Refills: 3 | Status: SHIPPED | OUTPATIENT
Start: 2022-07-18 | End: 2022-08-02

## 2022-07-18 NOTE — TELEPHONE ENCOUNTER
Received fax from pharmacy requesting refill on pts medication(s). Pt was last seen in office on 3/14/2022  and has a follow up scheduled for Visit date not found. Will send request to  Dr. Keshav Horton  for authorization. Requested Prescriptions     Pending Prescriptions Disp Refills    testosterone cypionate (DEPOTESTOTERONE CYPIONATE) 200 MG/ML injection [Pharmacy Med Name: TESTOSTERONE  MG/ML] 2 mL 3     Sig: INJECT 1 ML INTO THE MUSCLE EVERY 14 DAYS FOR 2 DOSES.

## 2022-08-09 RX ORDER — OMEPRAZOLE 40 MG/1
40 CAPSULE, DELAYED RELEASE ORAL DAILY
Qty: 90 CAPSULE | Refills: 3 | Status: SHIPPED | OUTPATIENT
Start: 2022-08-09

## 2022-08-09 NOTE — TELEPHONE ENCOUNTER
Received fax from pharmacy requesting refill on pts medication(s). Pt was last seen in office on 3/14/2022  and has a follow up scheduled for Visit date not found. Will send request to  Jose Luis Gooden  for authorization.      Requested Prescriptions     Pending Prescriptions Disp Refills    omeprazole (PRILOSEC) 40 MG delayed release capsule [Pharmacy Med Name: OMEPRAZOLE DR 40 MG CAPSULE] 90 capsule 3     Sig: TAKE 1 CAPSULE BY MOUTH DAILY (NEEDS A FOLLOW UP BEFORE FURTHER REFILLS)

## 2023-06-22 ENCOUNTER — TELEPHONE (OUTPATIENT)
Dept: FAMILY MEDICINE CLINIC | Age: 28
End: 2023-06-22

## 2023-06-22 DIAGNOSIS — E29.1 HYPOGONADISM IN MALE: ICD-10-CM

## 2023-06-22 LAB
TESTOSTERONE FREE: 4.5
TESTOSTERONE TOTAL: 335

## 2023-06-22 RX ORDER — CIPROFLOXACIN 500 MG/1
500 TABLET, FILM COATED ORAL 2 TIMES DAILY
Qty: 20 TABLET | Refills: 0 | Status: SHIPPED | OUTPATIENT
Start: 2023-06-22 | End: 2023-07-02

## 2023-06-22 RX ORDER — TESTOSTERONE CYPIONATE 200 MG/ML
200 INJECTION, SOLUTION INTRAMUSCULAR
Qty: 2 ML | Refills: 5 | Status: SHIPPED | OUTPATIENT
Start: 2023-06-22 | End: 2023-11-24

## 2023-06-22 NOTE — PROGRESS NOTES
[address] [facility], [address]  Phone [phone]   Fax [phone]      OFFICE VISIT: 2023    Edith Ryandonteangel luis-: 1995      HPI  Reason For Visit:  Collins Carrillo is a 33 y.o. No chief complaint on file. vitals were not taken for this visit. There is no height or weight on file to calculate BMI. I have reviewed the following with the Mr. Downey Screen   Lab Review  Orders Only on 2023   Component Date Value    Testosterone 2023 335     Testosterone, Free 2023 4.5      Copies of these are in the chart. Current Outpatient Medications   Medication Sig Dispense Refill    ciprofloxacin (CIPRO) 500 MG tablet Take 1 tablet by mouth 2 times daily for 10 days 20 tablet 0    omeprazole (PRILOSEC) 40 MG delayed release capsule TAKE 1 CAPSULE BY MOUTH DAILY (NEEDS A FOLLOW UP BEFORE FURTHER REFILLS) 90 capsule 3    testosterone cypionate (DEPOTESTOTERONE CYPIONATE) 200 MG/ML injection INJECT 1 ML INTO THE MUSCLE EVERY 14 DAYS FOR 2 DOSES. 2 mL 3    tamsulosin (FLOMAX) 0.4 MG capsule Take 1 capsule by mouth daily 90 capsule 1     No current facility-administered medications for this visit.        Allergies: Percocet [oxycodone-acetaminophen]     Past Medical History:   Diagnosis Date    Asthma     Has outgrown it    Vision abnormalities     wears glasses/contacts       Family History   Problem Relation Age of Onset    High Blood Pressure Mother     High Blood Pressure Maternal Grandmother     Diabetes Maternal Grandmother     High Cholesterol Maternal Grandfather     High Blood Pressure Maternal Grandfather     Diabetes Maternal Grandfather     Cancer Paternal Grandmother         Pancreatic     High Blood Pressure Paternal Grandmother        Past Surgical History:   Procedure Laterality Date    CYST REMOVAL      on the facial area    SHOULDER SURGERY      Labrum repair    TYMPANOSTOMY TUBE PLACEMENT         Social History     Tobacco Use    Smoking

## 2023-08-17 RX ORDER — OMEPRAZOLE 40 MG/1
40 CAPSULE, DELAYED RELEASE ORAL DAILY
Qty: 90 CAPSULE | Refills: 3 | Status: SHIPPED | OUTPATIENT
Start: 2023-08-17

## 2023-08-17 NOTE — TELEPHONE ENCOUNTER
Received fax from pharmacy requesting refill on pts medication(s). Pt was last seen in office on 03/14/2022  and has a follow up scheduled for Visit date not found. Will send request to  Dr. Alena Wilkins  for authorization.      Requested Prescriptions     Pending Prescriptions Disp Refills    omeprazole (PRILOSEC) 40 MG delayed release capsule [Pharmacy Med Name: OMEPRAZOLE DR 40 MG CAPSULE] 90 capsule 3     Sig: Take 1 capsule by mouth daily (needs a follow up before further refills)

## 2024-03-07 ENCOUNTER — OFFICE VISIT (OUTPATIENT)
Dept: FAMILY MEDICINE CLINIC | Age: 29
End: 2024-03-07
Payer: COMMERCIAL

## 2024-03-07 VITALS
TEMPERATURE: 97.6 F | WEIGHT: 195 LBS | OXYGEN SATURATION: 98 % | HEART RATE: 78 BPM | SYSTOLIC BLOOD PRESSURE: 132 MMHG | DIASTOLIC BLOOD PRESSURE: 88 MMHG | BODY MASS INDEX: 27.2 KG/M2

## 2024-03-07 DIAGNOSIS — A08.4 VIRAL GASTROENTERITIS: Primary | ICD-10-CM

## 2024-03-07 PROCEDURE — 99213 OFFICE O/P EST LOW 20 MIN: CPT | Performed by: NURSE PRACTITIONER

## 2024-03-07 SDOH — ECONOMIC STABILITY: FOOD INSECURITY: WITHIN THE PAST 12 MONTHS, YOU WORRIED THAT YOUR FOOD WOULD RUN OUT BEFORE YOU GOT MONEY TO BUY MORE.: NEVER TRUE

## 2024-03-07 SDOH — ECONOMIC STABILITY: FOOD INSECURITY: WITHIN THE PAST 12 MONTHS, THE FOOD YOU BOUGHT JUST DIDN'T LAST AND YOU DIDN'T HAVE MONEY TO GET MORE.: NEVER TRUE

## 2024-03-07 SDOH — ECONOMIC STABILITY: INCOME INSECURITY: HOW HARD IS IT FOR YOU TO PAY FOR THE VERY BASICS LIKE FOOD, HOUSING, MEDICAL CARE, AND HEATING?: NOT HARD AT ALL

## 2024-03-07 SDOH — ECONOMIC STABILITY: HOUSING INSECURITY
IN THE LAST 12 MONTHS, WAS THERE A TIME WHEN YOU DID NOT HAVE A STEADY PLACE TO SLEEP OR SLEPT IN A SHELTER (INCLUDING NOW)?: NO

## 2024-03-07 ASSESSMENT — PATIENT HEALTH QUESTIONNAIRE - PHQ9
SUM OF ALL RESPONSES TO PHQ QUESTIONS 1-9: 0
1. LITTLE INTEREST OR PLEASURE IN DOING THINGS: 0
SUM OF ALL RESPONSES TO PHQ QUESTIONS 1-9: 0
2. FEELING DOWN, DEPRESSED OR HOPELESS: 0
SUM OF ALL RESPONSES TO PHQ QUESTIONS 1-9: 0
SUM OF ALL RESPONSES TO PHQ9 QUESTIONS 1 & 2: 0
SUM OF ALL RESPONSES TO PHQ QUESTIONS 1-9: 0

## 2024-03-07 ASSESSMENT — ENCOUNTER SYMPTOMS
SORE THROAT: 0
COUGH: 0
SINUS PRESSURE: 0
SHORTNESS OF BREATH: 0
BACK PAIN: 0
WHEEZING: 0
DIARRHEA: 1

## 2024-03-07 NOTE — PROGRESS NOTES
cerumen.      Nose: No rhinorrhea.      Mouth/Throat:      Pharynx: No posterior oropharyngeal erythema.   Cardiovascular:      Rate and Rhythm: Normal rate and regular rhythm.      Heart sounds: No murmur heard.  Pulmonary:      Effort: Pulmonary effort is normal.      Breath sounds: Normal breath sounds. No wheezing or rhonchi.   Abdominal:      General: Bowel sounds are increased.   Skin:     Capillary Refill: Capillary refill takes less than 2 seconds.      Findings: No rash.   Neurological:      General: No focal deficit present.      Mental Status: He is alert and oriented to person, place, and time.   Psychiatric:         Mood and Affect: Mood normal.         Behavior: Behavior normal.                   An electronic signature was used to authenticate this note.    --GIANCARLO Diaz

## 2024-05-08 ENCOUNTER — TELEPHONE (OUTPATIENT)
Dept: FAMILY MEDICINE CLINIC | Age: 29
End: 2024-05-08

## 2024-05-08 DIAGNOSIS — R79.89 LOW TESTOSTERONE IN MALE: ICD-10-CM

## 2024-05-08 DIAGNOSIS — E29.1 HYPOGONADISM IN MALE: ICD-10-CM

## 2024-05-08 DIAGNOSIS — Z00.00 ANNUAL PHYSICAL EXAM: Primary | ICD-10-CM

## 2024-05-13 ENCOUNTER — HOSPITAL ENCOUNTER (OUTPATIENT)
Dept: GENERAL RADIOLOGY | Age: 29
Discharge: HOME | End: 2024-05-13
Payer: COMMERCIAL

## 2024-05-13 ENCOUNTER — OFFICE VISIT (OUTPATIENT)
Dept: FAMILY MEDICINE CLINIC | Age: 29
End: 2024-05-13
Payer: COMMERCIAL

## 2024-05-13 VITALS
DIASTOLIC BLOOD PRESSURE: 78 MMHG | SYSTOLIC BLOOD PRESSURE: 120 MMHG | OXYGEN SATURATION: 99 % | HEART RATE: 68 BPM | BODY MASS INDEX: 26.85 KG/M2 | TEMPERATURE: 97.3 F | WEIGHT: 192.5 LBS

## 2024-05-13 DIAGNOSIS — R74.8 ELEVATED LIVER ENZYMES: ICD-10-CM

## 2024-05-13 DIAGNOSIS — R10.11 RIGHT UPPER QUADRANT ABDOMINAL PAIN: ICD-10-CM

## 2024-05-13 DIAGNOSIS — R10.11 RIGHT UPPER QUADRANT ABDOMINAL PAIN: Primary | ICD-10-CM

## 2024-05-13 DIAGNOSIS — R10.11 RIGHT UPPER QUADRANT PAIN: Primary | ICD-10-CM

## 2024-05-13 PROCEDURE — 99214 OFFICE O/P EST MOD 30 MIN: CPT | Performed by: NURSE PRACTITIONER

## 2024-05-13 PROCEDURE — 76705 ECHO EXAM OF ABDOMEN: CPT

## 2024-05-13 ASSESSMENT — ENCOUNTER SYMPTOMS
COUGH: 0
SORE THROAT: 0
VOMITING: 0
CONSTIPATION: 1
ABDOMINAL PAIN: 1
DIARRHEA: 0
NAUSEA: 1
SHORTNESS OF BREATH: 0
WHEEZING: 0
BACK PAIN: 0

## 2024-05-13 NOTE — PROGRESS NOTES
Juvenal Diaz (:  1995) is a 29 y.o. male,Established patient, here for evaluation of the following chief complaint(s):  Abdominal Pain (RUQ abdominal pain with elevated liver enzymes on [facility] lab results)      ASSESSMENT/PLAN:    ICD-10-CM    1. Right upper quadrant pain  R10.11 CBC with Auto Differential     Comprehensive Metabolic Panel     Lipase  Discussed bland diet  Monitor for fever or worsening  Will get RUQ US if negative hida scan        2. Elevated liver enzymes  R74.8 CBC with Auto Differential     Comprehensive Metabolic Panel     Lipase  If still elevated will need GI   Reports GERD off and on  Avoided tylenol and motrin            Return if symptoms worsen or fail to improve.    SUBJECTIVE/OBJECTIVE:  HPI  Patient is here for RUQ pain  Reports Wednesday or Thursday  Reports that no vomiting  Diarrhea but had taken miralax for constipation  ( normal for him )    Reports RUQ pain   Reports worse with eating and intermittent off and on      GERD   Notes worse recently  As flairs with his medications    He notes in march noted elevated liver enzymes   But had gotten over cold  Was taking tylenol and motrin etc  Not a drinker  Then had rechecked a week later and had went in half      /78 (Site: Right Upper Arm, Position: Sitting, Cuff Size: Large Adult)   Pulse 68   Temp 97.3 °F (36.3 °C) (Temporal)   Wt 87.3 kg (192 lb 8 oz)   SpO2 99%   BMI 26.85 kg/m²   Review of Systems   Constitutional:  Positive for activity change and appetite change. Negative for fatigue and fever.   HENT:  Negative for congestion, postnasal drip and sore throat.    Respiratory:  Negative for cough, shortness of breath and wheezing.    Cardiovascular:  Negative for chest pain, palpitations and leg swelling.   Gastrointestinal:  Positive for abdominal pain (RUQ varies in intensity), constipation (wax and wane) and nausea (gerd). Negative for diarrhea and vomiting.   Genitourinary:  Negative for difficulty

## 2024-05-14 ENCOUNTER — TELEPHONE (OUTPATIENT)
Dept: FAMILY MEDICINE CLINIC | Age: 29
End: 2024-05-14

## 2024-05-14 DIAGNOSIS — R10.11 RIGHT UPPER QUADRANT PAIN: ICD-10-CM

## 2024-05-14 DIAGNOSIS — R10.11 RIGHT UPPER QUADRANT PAIN: Primary | ICD-10-CM

## 2024-05-14 DIAGNOSIS — R74.8 ELEVATED LIVER ENZYMES: ICD-10-CM

## 2024-05-14 LAB
ALBUMIN SERPL-MCNC: 4.4 G/DL (ref 3.5–5.2)
ALP SERPL-CCNC: 47 U/L (ref 40–130)
ALT SERPL-CCNC: 54 U/L (ref 5–41)
ANION GAP SERPL CALCULATED.3IONS-SCNC: 13 MMOL/L (ref 7–19)
AST SERPL-CCNC: 26 U/L (ref 5–40)
BASOPHILS # BLD: 0 K/UL (ref 0–0.2)
BASOPHILS NFR BLD: 0.8 % (ref 0–1)
BILIRUB SERPL-MCNC: 0.5 MG/DL (ref 0.2–1.2)
BUN SERPL-MCNC: 11 MG/DL (ref 6–20)
CALCIUM SERPL-MCNC: 9.1 MG/DL (ref 8.6–10)
CHLORIDE SERPL-SCNC: 102 MMOL/L (ref 98–111)
CO2 SERPL-SCNC: 25 MMOL/L (ref 22–29)
CREAT SERPL-MCNC: 1 MG/DL (ref 0.5–1.2)
EOSINOPHIL # BLD: 0.1 K/UL (ref 0–0.6)
EOSINOPHIL NFR BLD: 2 % (ref 0–5)
ERYTHROCYTE [DISTWIDTH] IN BLOOD BY AUTOMATED COUNT: 11.7 % (ref 11.5–14.5)
GLUCOSE SERPL-MCNC: 87 MG/DL (ref 74–109)
HCT VFR BLD AUTO: 42.3 % (ref 42–52)
HGB BLD-MCNC: 13.9 G/DL (ref 14–18)
IMM GRANULOCYTES # BLD: 0 K/UL
LIPASE SERPL-CCNC: 73 U/L (ref 13–60)
LYMPHOCYTES # BLD: 1.8 K/UL (ref 1.1–4.5)
LYMPHOCYTES NFR BLD: 34.6 % (ref 20–40)
MCH RBC QN AUTO: 31.8 PG (ref 27–31)
MCHC RBC AUTO-ENTMCNC: 32.9 G/DL (ref 33–37)
MCV RBC AUTO: 96.8 FL (ref 80–94)
MONOCYTES # BLD: 0.4 K/UL (ref 0–0.9)
MONOCYTES NFR BLD: 6.9 % (ref 0–10)
NEUTROPHILS # BLD: 2.8 K/UL (ref 1.5–7.5)
NEUTS SEG NFR BLD: 55.5 % (ref 50–65)
PLATELET # BLD AUTO: 248 K/UL (ref 130–400)
PMV BLD AUTO: 9.6 FL (ref 9.4–12.4)
POTASSIUM SERPL-SCNC: 3.9 MMOL/L (ref 3.5–5)
PROT SERPL-MCNC: 7 G/DL (ref 6.6–8.7)
RBC # BLD AUTO: 4.37 M/UL (ref 4.7–6.1)
SODIUM SERPL-SCNC: 140 MMOL/L (ref 136–145)
WBC # BLD AUTO: 5.1 K/UL (ref 4.8–10.8)

## 2024-05-14 NOTE — TELEPHONE ENCOUNTER
----- Message from GIANCARLO Diaz sent at 5/14/2024 10:07 AM CDT -----  US noted liver wnl galbladder normal   If pain still present please order CK HIDA scan under me

## 2024-05-14 NOTE — TELEPHONE ENCOUNTER
Called patient, spoke with: Patient regarding the results of the patients most recent US.  I advised Patient of Alisa Juarez recommendations.   Patient did voice understanding

## 2024-05-15 ENCOUNTER — TELEPHONE (OUTPATIENT)
Dept: FAMILY MEDICINE CLINIC | Age: 29
End: 2024-05-15

## 2024-05-15 DIAGNOSIS — R10.11 RIGHT UPPER QUADRANT PAIN: Primary | ICD-10-CM

## 2024-05-15 DIAGNOSIS — R74.8 ELEVATED LIVER ENZYMES: ICD-10-CM

## 2024-05-15 DIAGNOSIS — R74.8 ELEVATED LIPASE: ICD-10-CM

## 2024-05-15 NOTE — TELEPHONE ENCOUNTER
----- Message from GIANCARLO Diaz sent at 5/14/2024  6:01 PM CDT -----  Lipase slight elevation suggest questionable pancreatitis . Cmp noted liver enzymes improved some   Due to lipase suggest ct abdomen and pelvis without contrast due to elevated liver enzymes elevated lipase and abdominal pain   Cont bland diet

## 2024-05-15 NOTE — TELEPHONE ENCOUNTER
Called patient, spoke with: Patient regarding the results of the patients most recent labs.  I advised Patient of Alisa Juarez recommendations.   Patient did voice understanding

## 2024-05-16 ENCOUNTER — HOSPITAL ENCOUNTER (OUTPATIENT)
Dept: GENERAL RADIOLOGY | Age: 29
Discharge: HOME | End: 2024-05-16
Payer: COMMERCIAL

## 2024-05-16 DIAGNOSIS — R74.8 ELEVATED LIVER ENZYMES: ICD-10-CM

## 2024-05-16 DIAGNOSIS — R74.8 ELEVATED LIPASE: ICD-10-CM

## 2024-05-16 DIAGNOSIS — R10.11 RIGHT UPPER QUADRANT PAIN: ICD-10-CM

## 2024-05-16 DIAGNOSIS — R10.11 RIGHT UPPER QUADRANT PAIN: Primary | ICD-10-CM

## 2024-05-16 PROCEDURE — 74150 CT ABDOMEN W/O CONTRAST: CPT

## 2024-05-17 ENCOUNTER — TELEPHONE (OUTPATIENT)
Dept: FAMILY MEDICINE CLINIC | Age: 29
End: 2024-05-17

## 2024-05-17 NOTE — TELEPHONE ENCOUNTER
----- Message from RENO Cummings DO sent at 5/16/2024  5:43 PM CDT -----  CT scan shows significant constipation otherwise unremarkable

## 2024-05-17 NOTE — TELEPHONE ENCOUNTER
----- Message from GIANCARLO Preciado sent at 5/17/2024 12:34 PM CDT -----  Please let patient know that CT of abdomen has returned.  It was negative for any acute abdominal or pelvic process.  Liver was of normal size.  Did note some constipation and a small fat-containing umbilical hernia.

## 2024-06-01 ENCOUNTER — TELEPHONE (OUTPATIENT)
Dept: FAMILY MEDICINE CLINIC | Age: 29
End: 2024-06-01

## 2024-06-01 RX ORDER — AZITHROMYCIN 250 MG/1
TABLET, FILM COATED ORAL
Qty: 6 TABLET | Refills: 0 | Status: SHIPPED | OUTPATIENT
Start: 2024-06-01 | End: 2024-06-11

## 2024-06-10 ENCOUNTER — TELEPHONE (OUTPATIENT)
Dept: FAMILY MEDICINE CLINIC | Age: 29
End: 2024-06-10

## 2024-06-10 ENCOUNTER — OFFICE VISIT (OUTPATIENT)
Dept: FAMILY MEDICINE CLINIC | Age: 29
End: 2024-06-10
Payer: COMMERCIAL

## 2024-06-10 VITALS
SYSTOLIC BLOOD PRESSURE: 122 MMHG | DIASTOLIC BLOOD PRESSURE: 80 MMHG | WEIGHT: 194 LBS | HEART RATE: 66 BPM | TEMPERATURE: 99.1 F | OXYGEN SATURATION: 99 % | HEIGHT: 71 IN | BODY MASS INDEX: 27.16 KG/M2

## 2024-06-10 DIAGNOSIS — N45.1 EPIDIDYMITIS: Primary | ICD-10-CM

## 2024-06-10 DIAGNOSIS — R10.9 ABDOMINAL PAIN, UNSPECIFIED ABDOMINAL LOCATION: ICD-10-CM

## 2024-06-10 LAB
BILIRUBIN, POC: NORMAL
BLOOD URINE, POC: NORMAL
CLARITY, POC: CLEAR
COLOR, POC: YELLOW
GLUCOSE URINE, POC: NORMAL
HETEROPHILE ANTIBODIES: NEGATIVE
KETONES, POC: NORMAL
LEUKOCYTE EST, POC: NORMAL
PH, POC: 7
PROTEIN, POC: NORMAL
SPECIFIC GRAVITY, POC: 1.02
UROBILINOGEN, POC: 1

## 2024-06-10 PROCEDURE — 99213 OFFICE O/P EST LOW 20 MIN: CPT | Performed by: NURSE PRACTITIONER

## 2024-06-10 PROCEDURE — 86308 HETEROPHILE ANTIBODY SCREEN: CPT | Performed by: NURSE PRACTITIONER

## 2024-06-10 PROCEDURE — 81002 URINALYSIS NONAUTO W/O SCOPE: CPT | Performed by: NURSE PRACTITIONER

## 2024-06-10 RX ORDER — CIPROFLOXACIN 500 MG/1
500 TABLET, FILM COATED ORAL 2 TIMES DAILY
Qty: 20 TABLET | Refills: 0 | Status: SHIPPED | OUTPATIENT
Start: 2024-06-10 | End: 2024-06-10

## 2024-06-10 RX ORDER — SULFAMETHOXAZOLE AND TRIMETHOPRIM 800; 160 MG/1; MG/1
1 TABLET ORAL 2 TIMES DAILY
Qty: 20 TABLET | Refills: 0 | Status: SHIPPED | OUTPATIENT
Start: 2024-06-10 | End: 2024-06-20

## 2024-06-10 SDOH — ECONOMIC STABILITY: FOOD INSECURITY: WITHIN THE PAST 12 MONTHS, THE FOOD YOU BOUGHT JUST DIDN'T LAST AND YOU DIDN'T HAVE MONEY TO GET MORE.: NEVER TRUE

## 2024-06-10 SDOH — ECONOMIC STABILITY: INCOME INSECURITY: HOW HARD IS IT FOR YOU TO PAY FOR THE VERY BASICS LIKE FOOD, HOUSING, MEDICAL CARE, AND HEATING?: NOT HARD AT ALL

## 2024-06-10 SDOH — ECONOMIC STABILITY: FOOD INSECURITY: WITHIN THE PAST 12 MONTHS, YOU WORRIED THAT YOUR FOOD WOULD RUN OUT BEFORE YOU GOT MONEY TO BUY MORE.: NEVER TRUE

## 2024-06-10 ASSESSMENT — PATIENT HEALTH QUESTIONNAIRE - PHQ9
SUM OF ALL RESPONSES TO PHQ QUESTIONS 1-9: 0
SUM OF ALL RESPONSES TO PHQ9 QUESTIONS 1 & 2: 0
2. FEELING DOWN, DEPRESSED OR HOPELESS: NOT AT ALL
SUM OF ALL RESPONSES TO PHQ QUESTIONS 1-9: 0
1. LITTLE INTEREST OR PLEASURE IN DOING THINGS: NOT AT ALL

## 2024-06-10 ASSESSMENT — ENCOUNTER SYMPTOMS
SORE THROAT: 0
COUGH: 0
SHORTNESS OF BREATH: 0
WHEEZING: 0
TROUBLE SWALLOWING: 0
BACK PAIN: 0

## 2024-06-10 NOTE — PROGRESS NOTES
Musculoskeletal:  Negative for arthralgias and back pain.   Psychiatric/Behavioral:  Negative for behavioral problems, self-injury and sleep disturbance. The patient is not nervous/anxious.        Physical Exam  Vitals reviewed.   Constitutional:       General: He is not in acute distress.     Appearance: Normal appearance. He is not ill-appearing.   HENT:      Head: Normocephalic.   Cardiovascular:      Rate and Rhythm: Normal rate and regular rhythm.   Genitourinary:     Comments: Reports pain with lifting right testicle and posterior epidimis area    Neurological:      General: No focal deficit present.      Mental Status: He is alert and oriented to person, place, and time.   Psychiatric:         Mood and Affect: Mood normal.         Behavior: Behavior normal.                   An electronic signature was used to authenticate this note.    --GIANCARLO Diaz

## 2024-06-10 NOTE — TELEPHONE ENCOUNTER
Called pharmacy to let them know to cancel cipro and process the bactrim. Stated they would do so.

## 2024-06-14 DIAGNOSIS — N50.811 PAIN IN RIGHT TESTICLE: Primary | ICD-10-CM

## 2024-06-18 ENCOUNTER — HOSPITAL ENCOUNTER (OUTPATIENT)
Dept: GENERAL RADIOLOGY | Age: 29
Discharge: HOME | End: 2024-06-18
Payer: COMMERCIAL

## 2024-06-18 DIAGNOSIS — N50.811 PAIN IN RIGHT TESTICLE: ICD-10-CM

## 2024-06-18 PROCEDURE — 76870 US EXAM SCROTUM: CPT

## 2024-06-19 ENCOUNTER — TELEPHONE (OUTPATIENT)
Dept: FAMILY MEDICINE CLINIC | Age: 29
End: 2024-06-19

## 2024-06-19 DIAGNOSIS — I86.1 VARICOCELE: ICD-10-CM

## 2024-06-19 DIAGNOSIS — R10.9 ABDOMINAL PAIN, UNSPECIFIED ABDOMINAL LOCATION: Primary | ICD-10-CM

## 2024-06-19 NOTE — TELEPHONE ENCOUNTER
Called patient, spoke with: Patient regarding the results of the patients most recent US .  I advised Patient of Alisa Juarez recommendations.   Patient did voice understanding      Pt states it is the same no improvement or worsening please advise

## 2024-06-19 NOTE — TELEPHONE ENCOUNTER
----- Message from GIANCARLO Diaz sent at 6/19/2024 12:35 PM CDT -----  Us testicle noted small right varocele but larger on the left .. is the pain improving on the antibiotics ?

## 2024-06-26 ENCOUNTER — OFFICE VISIT (OUTPATIENT)
Dept: UROLOGY | Age: 29
End: 2024-06-26
Payer: COMMERCIAL

## 2024-06-26 VITALS — TEMPERATURE: 98.4 F | WEIGHT: 195.6 LBS | BODY MASS INDEX: 27.38 KG/M2 | HEIGHT: 71 IN

## 2024-06-26 DIAGNOSIS — E29.1 HYPOGONADISM IN MALE: ICD-10-CM

## 2024-06-26 DIAGNOSIS — N50.811 RIGHT TESTICULAR PAIN: Primary | ICD-10-CM

## 2024-06-26 DIAGNOSIS — I86.1 BILATERAL VARICOCELES: ICD-10-CM

## 2024-06-26 LAB
APPEARANCE FLUID: CLEAR
BILIRUBIN, POC: NORMAL
BLOOD URINE, POC: NORMAL
CLARITY, POC: CLEAR
COLOR, POC: YELLOW
GLUCOSE URINE, POC: NORMAL
KETONES, POC: NORMAL
LEUKOCYTE EST, POC: NORMAL
NITRITE, POC: NORMAL
PH, POC: 7
PROTEIN, POC: NORMAL
SPECIFIC GRAVITY, POC: 1.02
UROBILINOGEN, POC: 1

## 2024-06-26 PROCEDURE — 99204 OFFICE O/P NEW MOD 45 MIN: CPT | Performed by: NURSE PRACTITIONER

## 2024-06-26 PROCEDURE — 81002 URINALYSIS NONAUTO W/O SCOPE: CPT | Performed by: NURSE PRACTITIONER

## 2024-06-26 RX ORDER — KETOROLAC TROMETHAMINE 10 MG/1
10 TABLET, FILM COATED ORAL EVERY 6 HOURS PRN
Qty: 20 TABLET | Refills: 0 | Status: SHIPPED | OUTPATIENT
Start: 2024-06-26

## 2024-06-26 NOTE — PROGRESS NOTES
Juvenal Diaz is a 29 y.o. male who presents today   Chief Complaint   Patient presents with    New Patient     I am here today for right testicular pain       Scrotal / Testicular Pain:  Patient is here today for a scrotal/testicular pain which was first noted 1 month(s) ago.    The pain is: right.  Recently the symptoms: are improving  Current medical Rx for pain: none  Pain onset: acute  Pain type: sharp  Pain severity: mild  Flank pain? No  Lower urinary tract symptoms: none     Patient was treated for epididymitis he does state pain is improving.  Prior to this pain was worse with lifting.  Denies any dysuria or lower urinary tract symptoms.  No risk for STDs.  He recently had a baby.  Denies any erythema or edema.  No history of kidney stones.  Most recent CT negative.    Patient has a history of low testosterone has been on replacement for the last 9 to 10 years.  Initially had difficulty with fertility.  He now has 2 children and is back on TRT.    Comes in today with a ultrasound.  Essentially ultrasound is unchanged from previous ultrasound in 2020.  Revealing bilateral varicoceles worse on the left.  Small left epididymal cyst.      Past Medical History:   Diagnosis Date    Asthma     Has outgrown it    Vision abnormalities     wears glasses/contacts       Past Surgical History:   Procedure Laterality Date    CYST REMOVAL      on the facial area    SHOULDER SURGERY      Labrum repair    TYMPANOSTOMY TUBE PLACEMENT  1995       Current Outpatient Medications   Medication Sig Dispense Refill    ketorolac (TORADOL) 10 MG tablet Take 1 tablet by mouth every 6 hours as needed for Pain 20 tablet 0    testosterone cypionate (DEPOTESTOTERONE CYPIONATE) 200 MG/ML injection Inject 1 mL into the muscle every 14 days for 12 doses. Max Daily Amount: 200 mg 2 mL 0    omeprazole (PRILOSEC) 40 MG delayed release capsule Take 1 capsule by mouth daily (needs a follow up before further refills) 90 capsule 3     No current

## 2024-07-05 RX ORDER — SULFAMETHOXAZOLE AND TRIMETHOPRIM 800; 160 MG/1; MG/1
1 TABLET ORAL 2 TIMES DAILY
Qty: 14 TABLET | Refills: 0 | Status: SHIPPED | OUTPATIENT
Start: 2024-07-05 | End: 2024-07-12

## 2024-07-26 ENCOUNTER — TELEPHONE (OUTPATIENT)
Dept: FAMILY MEDICINE CLINIC | Age: 29
End: 2024-07-26

## 2024-07-26 NOTE — TELEPHONE ENCOUNTER
Pt mother requested lab orders be printed off for pt to get drawn elsewhere- these have been printed and given to Adriana PAYNE

## 2024-08-09 DIAGNOSIS — E29.1 HYPOGONADISM IN MALE: ICD-10-CM

## 2024-08-09 DIAGNOSIS — Z00.00 ANNUAL PHYSICAL EXAM: ICD-10-CM

## 2024-08-09 DIAGNOSIS — R79.89 LOW TESTOSTERONE IN MALE: ICD-10-CM

## 2024-08-09 LAB
ALBUMIN SERPL-MCNC: 4.5 G/DL (ref 3.5–5.2)
ALP SERPL-CCNC: 54 U/L (ref 40–130)
ALT SERPL-CCNC: 71 U/L (ref 5–41)
ANION GAP SERPL CALCULATED.3IONS-SCNC: 15 MMOL/L (ref 7–19)
AST SERPL-CCNC: 32 U/L (ref 5–40)
BASOPHILS # BLD: 0 K/UL (ref 0–0.2)
BASOPHILS NFR BLD: 0.9 % (ref 0–1)
BILIRUB SERPL-MCNC: 1.2 MG/DL (ref 0.2–1.2)
BUN SERPL-MCNC: 18 MG/DL (ref 6–20)
CALCIUM SERPL-MCNC: 9.5 MG/DL (ref 8.6–10)
CHLORIDE SERPL-SCNC: 104 MMOL/L (ref 98–111)
CHOLEST SERPL-MCNC: 178 MG/DL (ref 160–199)
CO2 SERPL-SCNC: 23 MMOL/L (ref 22–29)
CREAT SERPL-MCNC: 1.1 MG/DL (ref 0.5–1.2)
CREAT UR-MCNC: 269.1 MG/DL (ref 39–259)
EOSINOPHIL # BLD: 0.1 K/UL (ref 0–0.6)
EOSINOPHIL NFR BLD: 1.7 % (ref 0–5)
ERYTHROCYTE [DISTWIDTH] IN BLOOD BY AUTOMATED COUNT: 11.7 % (ref 11.5–14.5)
GLUCOSE SERPL-MCNC: 88 MG/DL (ref 74–109)
HCT VFR BLD AUTO: 43.8 % (ref 42–52)
HDLC SERPL-MCNC: 44 MG/DL (ref 55–121)
HGB BLD-MCNC: 14.4 G/DL (ref 14–18)
IMM GRANULOCYTES # BLD: 0 K/UL
LDLC SERPL CALC-MCNC: 124 MG/DL
LYMPHOCYTES # BLD: 1.5 K/UL (ref 1.1–4.5)
LYMPHOCYTES NFR BLD: 32.8 % (ref 20–40)
MCH RBC QN AUTO: 31.9 PG (ref 27–31)
MCHC RBC AUTO-ENTMCNC: 32.9 G/DL (ref 33–37)
MCV RBC AUTO: 97.1 FL (ref 80–94)
MICROALBUMIN UR-MCNC: <1.2 MG/DL (ref 0–19)
MICROALBUMIN/CREAT UR-RTO: ABNORMAL MG/G
MONOCYTES # BLD: 0.4 K/UL (ref 0–0.9)
MONOCYTES NFR BLD: 8.5 % (ref 0–10)
NEUTROPHILS # BLD: 2.6 K/UL (ref 1.5–7.5)
NEUTS SEG NFR BLD: 55.7 % (ref 50–65)
PLATELET # BLD AUTO: 259 K/UL (ref 130–400)
PMV BLD AUTO: 9.8 FL (ref 9.4–12.4)
POTASSIUM SERPL-SCNC: 4.4 MMOL/L (ref 3.5–5)
PROT SERPL-MCNC: 7.1 G/DL (ref 6.6–8.7)
RBC # BLD AUTO: 4.51 M/UL (ref 4.7–6.1)
SODIUM SERPL-SCNC: 142 MMOL/L (ref 136–145)
T4 FREE SERPL-MCNC: 1.32 NG/DL (ref 0.93–1.7)
TRIGL SERPL-MCNC: 50 MG/DL (ref 0–149)
TSH SERPL DL<=0.005 MIU/L-ACNC: 1.34 UIU/ML (ref 0.27–4.2)
WBC # BLD AUTO: 4.7 K/UL (ref 4.8–10.8)

## 2024-08-12 ENCOUNTER — TELEPHONE (OUTPATIENT)
Dept: FAMILY MEDICINE CLINIC | Age: 29
End: 2024-08-12

## 2024-08-12 LAB
ESTRADIOL SERPL HS-MCNC: 18.6 PG/ML (ref 10–42)
ESTROGEN SERPL CALC-MCNC: 39.8 PG/ML (ref 19–69)
ESTRONE SERPL-MCNC: 21.2 PG/ML (ref 9–36)

## 2024-08-12 NOTE — TELEPHONE ENCOUNTER
----- Message from Dr. RENO Cummings DO sent at 8/9/2024  5:14 PM CDT -----  No pathologic protein in urine.  Your LDL cholesterol is too high.  A low cholesterol diet and increase in exercise is recommended.  Even with that, it is unlikely that you will be able to lower the LDL enough to significantly reduce your risk of heart attacks and strokes.  We will need to use a medication to get this down.  My recommendation is to use Lipitor 10mg daily to bring this level down.  We can call in that Rx for you.  We also recommend to recheck the cholesterol and liver functions in 4-6 weeks.  Disp #[id number]  Your metabolic profile is normal.  This includes kidney and liver functions as well as electrolytes.  Your WBC, (infection fighting ability) Hgb and Hct, (oxygen carrying cells) are normal; as is your percentage of each cell type.  Thyroid functions are normal.  No need for any changes.

## 2024-08-12 NOTE — TELEPHONE ENCOUNTER
Called patient, spoke with: Parent(s) regarding the results of the patients most recent labs.  I advised Parent(s) of Dr. Cummings recommendations.   Parent(s) did voice understanding      Mother states she will discuss results further with PT and discuss medications

## 2024-08-13 ENCOUNTER — TELEPHONE (OUTPATIENT)
Dept: FAMILY MEDICINE CLINIC | Age: 29
End: 2024-08-13

## 2024-08-13 DIAGNOSIS — E29.1 HYPOGONADISM IN MALE: ICD-10-CM

## 2024-08-13 LAB
SHBG SERPL-SCNC: 27 NMOL/L (ref 17–56)
SHBG SERPL-SCNC: 95.6 PG/ML (ref 47–244)
TESTOST SERPL-MCNC: 418 NG/DL (ref 249–836)

## 2024-08-13 NOTE — TELEPHONE ENCOUNTER
----- Message from Dr. RENO Cummings DO sent at 8/13/2024  3:34 PM CDT -----  Testosterone is appropriate

## 2024-08-13 NOTE — TELEPHONE ENCOUNTER
Called patient, spoke with: Parent(s) regarding the results of the patients most recent labs.  I advised Parent(s) of Dr. Cummings recommendations.   Parent(s) did voice understanding       Mother will relay results

## 2024-08-14 RX ORDER — TESTOSTERONE CYPIONATE 200 MG/ML
200 INJECTION, SOLUTION INTRAMUSCULAR
Qty: 2 ML | Refills: 0 | OUTPATIENT
Start: 2024-08-14 | End: 2025-01-16

## 2024-08-14 NOTE — TELEPHONE ENCOUNTER
Patient is requesting a refill of testosterone. Patient was last seen on 6/10/2024 and has appointment on not found. Medication was last sent in on 12/24/2023 for #2mL and 0 rf.

## 2024-08-15 ENCOUNTER — TELEMEDICINE (OUTPATIENT)
Dept: FAMILY MEDICINE CLINIC | Age: 29
End: 2024-08-15
Payer: COMMERCIAL

## 2024-08-15 DIAGNOSIS — E29.1 HYPOGONADISM IN MALE: ICD-10-CM

## 2024-08-15 PROCEDURE — 99213 OFFICE O/P EST LOW 20 MIN: CPT | Performed by: PEDIATRICS

## 2024-08-15 RX ORDER — TESTOSTERONE CYPIONATE 200 MG/ML
200 INJECTION, SOLUTION INTRAMUSCULAR
Qty: 6 ML | Refills: 1 | Status: SHIPPED | OUTPATIENT
Start: 2024-08-15 | End: 2025-01-17

## 2024-08-15 SDOH — ECONOMIC STABILITY: GENERAL: IN THE LAST 12 MONTHS, WAS THERE A TIME WHEN YOU WERE NOT ABLE TO PAY THE MORTGAGE OR RENT ON TIME?: 2

## 2024-08-15 SDOH — ECONOMIC STABILITY: FOOD INSECURITY: WITHIN THE PAST 12 MONTHS, YOU WORRIED THAT YOUR FOOD WOULD RUN OUT BEFORE YOU GOT MONEY TO BUY MORE.: NEVER TRUE

## 2024-08-15 SDOH — ECONOMIC STABILITY: TRANSPORTATION INSECURITY
IN THE PAST 12 MONTHS, HAS THE LACK OF TRANSPORTATION KEPT YOU FROM MEDICAL APPOINTMENTS OR FROM GETTING MEDICATIONS?: NO

## 2024-08-15 SDOH — ECONOMIC STABILITY: TRANSPORTATION INSECURITY: IN THE PAST 12 MONTHS, HAS LACK OF TRANSPORTATION KEPT YOU FROM MEDICAL APPOINTMENTS OR FROM GETTING MEDICATIONS?: 2

## 2024-08-15 SDOH — ECONOMIC STABILITY: FOOD INSECURITY: WITHIN THE PAST 12 MONTHS, THE FOOD YOU BOUGHT JUST DIDN'T LAST AND YOU DIDN'T HAVE MONEY TO GET MORE.: NEVER TRUE

## 2024-08-15 SDOH — ECONOMIC STABILITY: INCOME INSECURITY: IN THE LAST 12 MONTHS, WAS THERE A TIME WHEN YOU WERE NOT ABLE TO PAY THE MORTGAGE OR RENT ON TIME?: NO

## 2024-08-15 SDOH — ECONOMIC STABILITY: HOUSING INSECURITY: AT ANY TIME IN THE PAST 12 MONTHS, WERE YOU HOMELESS OR LIVING IN A SHELTER (INCLUDING NOW)?: 2

## 2024-08-15 SDOH — ECONOMIC STABILITY: FOOD INSECURITY: WITHIN THE PAST 12 MONTHS, YOU WORRIED THAT YOUR FOOD WOULD RUN OUT BEFORE YOU GOT THE MONEY TO BUY MORE.: 1

## 2024-08-15 SDOH — ECONOMIC STABILITY: HOUSING INSECURITY: IN THE PAST 12 MONTHS, HOW MANY TIMES HAVE YOU MOVED WHERE YOU WERE LIVING?: 0

## 2024-08-15 SDOH — ECONOMIC STABILITY: TRANSPORTATION INSECURITY
IN THE PAST 12 MONTHS, HAS LACK OF TRANSPORTATION KEPT YOU FROM MEETINGS, WORK, OR FROM GETTING THINGS NEEDED FOR DAILY LIVING?: NO

## 2024-08-15 SDOH — ECONOMIC STABILITY: FOOD INSECURITY: WITHIN THE PAST 12 MONTHS, THE FOOD YOU BOUGHT JUST DIDN'T LAST AND YOU DIDN'T HAVE MONEY TO GET MORE.: 1

## 2024-08-15 ASSESSMENT — PATIENT HEALTH QUESTIONNAIRE - PHQ9
SUM OF ALL RESPONSES TO PHQ QUESTIONS 1-9: 0
1. LITTLE INTEREST OR PLEASURE IN DOING THINGS: NOT AT ALL
1. LITTLE INTEREST OR PLEASURE IN DOING THINGS: NOT AT ALL
SUM OF ALL RESPONSES TO PHQ QUESTIONS 1-9: 0
SUM OF ALL RESPONSES TO PHQ9 QUESTIONS 1 & 2: 0
SUM OF ALL RESPONSES TO PHQ9 QUESTIONS 1 & 2: 0
SUM OF ALL RESPONSES TO PHQ QUESTIONS 1-9: 0
2. FEELING DOWN, DEPRESSED OR HOPELESS: NOT AT ALL
SUM OF ALL RESPONSES TO PHQ QUESTIONS 1-9: 0

## 2024-08-15 ASSESSMENT — ENCOUNTER SYMPTOMS
SHORTNESS OF BREATH: 0
BACK PAIN: 0
COUGH: 0
SORE THROAT: 0
TROUBLE SWALLOWING: 0
WHEEZING: 0

## 2024-08-15 ASSESSMENT — ACTIVITIES OF DAILY LIVING (ADL): LACK_OF_TRANSPORTATION: 2

## 2024-08-15 NOTE — PROGRESS NOTES
[address] [place] [address]  Phone [phone]   Fax [phone]      OFFICE VISIT: 8/15/2024    Juvenal Diaz-: 1995      HPI  Reason For Visit:  Juvenal is a 29 y.o.     Low testosterone and Medication Refill    Patient presents via [facility] video conferencing on follow-up for low testosterone.  He typically takes testosterone cypionate 200 mg/mL, 1 mL every 2 weeks.  Last prescription refill of testosterone cypionate was on 2023 for 2 mL.  He obviously has not been on this medication continuously.  He is willing to get started again on this regimen.    He did start taking this medication recently and the testosterone was in the normal range.  Testosterone = 418 (normal = 249-836)       vitals were not taken for this visit.      There is no height or weight on file to calculate BMI.      I have reviewed the following with the Mr. Diaz   Lab Review  Orders Only on 2024   Component Date Value    Testosterone 2024 418     Sex Hormone Binding 2024 27     Testosterone, Free 2024 95.6     T4 Free 2024 1.32     TSH 2024 1.340     Microalb, Ur 2024 <1.20     Creatinine, Ur 2024 269.1 (H)     Microalb/Creat Ratio 2024 see below     Cholesterol, Fasting 2024 178     Triglyceride, Fasting 2024 50     HDL 2024 44 (L)     LDL Cholesterol 2024 124     Sodium 2024 142     Potassium 2024 4.4     Chloride 2024 104     CO2 2024 23     Anion Gap 2024 15     Glucose 2024 88     BUN 2024 18     Creatinine 2024 1.1     Est, Glom Filt Rate 2024 >90     Calcium 2024 9.5     Total Protein 2024 7.1     Albumin 2024 4.5     Total Bilirubin 2024 1.2     Alkaline Phosphatase 2024 54     ALT 2024 71 (H)     AST 2024 32     WBC 2024 4.7 (L)     RBC 2024 4.51 (L)     Hemoglobin 2024 14.4

## 2024-08-26 RX ORDER — OMEPRAZOLE 40 MG/1
40 CAPSULE, DELAYED RELEASE ORAL DAILY
Qty: 90 CAPSULE | Refills: 3 | Status: SHIPPED | OUTPATIENT
Start: 2024-08-26

## 2024-08-26 NOTE — TELEPHONE ENCOUNTER
Received fax from pharmacy requesting refill on pts medication(s). Pt was last seen in office on 8/15/2024  and has a follow up scheduled for 2/13/2025. Will send request to  Dr. Cummings  for authorization.     Requested Prescriptions     Pending Prescriptions Disp Refills    omeprazole (PRILOSEC) 40 MG delayed release capsule [Pharmacy Med Name: OMEPRAZOLE DR 40 MG CAPSULE] 90 capsule 3     Sig: TAKE 1 CAPSULE BY MOUTH DAILY (NEEDS A FOLLOW UP BEFORE FURTHER REFILLS)

## 2024-10-01 DIAGNOSIS — S39.012A BACK STRAIN, INITIAL ENCOUNTER: Primary | ICD-10-CM

## 2024-10-01 RX ORDER — PREDNISONE 10 MG/1
10 TABLET ORAL DAILY
Qty: 43 TABLET | Refills: 0 | Status: SHIPPED | OUTPATIENT
Start: 2024-10-01 | End: 2024-10-13

## 2024-10-15 ENCOUNTER — OFFICE VISIT (OUTPATIENT)
Dept: UROLOGY | Age: 29
End: 2024-10-15
Payer: COMMERCIAL

## 2024-10-15 VITALS — TEMPERATURE: 99.6 F | BODY MASS INDEX: 27.61 KG/M2 | WEIGHT: 197.2 LBS | HEIGHT: 71 IN

## 2024-10-15 DIAGNOSIS — N48.9 PENILE LESION: Primary | ICD-10-CM

## 2024-10-15 LAB
BACTERIA URINE, POC: NORMAL
BILIRUBIN URINE: 0 MG/DL
BLOOD, URINE: POSITIVE
CASTS URINE, POC: NORMAL
CLARITY, UA: CLEAR
COLOR, UA: YELLOW
CRYSTALS URINE, POC: NORMAL
EPI CELLS URINE, POC: NORMAL
GLUCOSE URINE: NORMAL
KETONES, URINE: NEGATIVE
LEUKOCYTE EST, POC: NORMAL
NITRITE, URINE: NEGATIVE
PH UA: 6 (ref 4.5–8)
PROTEIN UA: NEGATIVE
RBC URINE, POC: NORMAL
SPECIFIC GRAVITY UA: 1.02 (ref 1–1.03)
UROBILINOGEN, URINE: NORMAL
WBC URINE, POC: NORMAL
YEAST URINE, POC: NORMAL

## 2024-10-15 PROCEDURE — 81001 URINALYSIS AUTO W/SCOPE: CPT | Performed by: NURSE PRACTITIONER

## 2024-10-15 PROCEDURE — 99213 OFFICE O/P EST LOW 20 MIN: CPT | Performed by: NURSE PRACTITIONER

## 2024-10-15 NOTE — PROGRESS NOTES
Juvenal Diaz is a 29 y.o. male who presents today   Chief Complaint   Patient presents with    Follow-up     I am here today for my follow up.  Patient states that he has a small spot under his skin on the tip of his penis   ( similar to a black head ). He also states that sometimes it hurts.       Patient is a 29-year-old male presents to clinic today with complaints of penile \" blackhead.\"  States 1 to 2 months ago started having intermittent pain mainly with erections.  Tried to did get out possible blackhead.  Denies any risk for STDs no urethral discharge or LUTS.  History of testicular pain however improved.  Penile lesion with no ulceration or fluctuation.    Patient has a history of low testosterone has been on replacement for the last 9 to 10 years.  Initially had difficulty with fertility.  He now has 2 children and is back on TRT.      Essentially ultrasound is unchanged from previous ultrasound in 2020.  Revealing bilateral varicoceles worse on the left.  Small left epididymal cyst.      Past Medical History:   Diagnosis Date    Asthma     Has outgrown it    Vision abnormalities     wears glasses/contacts       Past Surgical History:   Procedure Laterality Date    CYST REMOVAL      on the facial area    SHOULDER SURGERY      Labrum repair    TYMPANOSTOMY TUBE PLACEMENT  1995       Current Outpatient Medications   Medication Sig Dispense Refill    omeprazole (PRILOSEC) 40 MG delayed release capsule TAKE 1 CAPSULE BY MOUTH DAILY (NEEDS A FOLLOW UP BEFORE FURTHER REFILLS) 90 capsule 3    testosterone cypionate (DEPOTESTOTERONE CYPIONATE) 200 MG/ML injection Inject 1 mL into the muscle every 14 days for 12 doses. Max Daily Amount: 200 mg 6 mL 1     No current facility-administered medications for this visit.       Allergies   Allergen Reactions    Percocet [Oxycodone-Acetaminophen] Nausea And Vomiting       Social History     Socioeconomic History    Marital status:      Spouse name: None    Number

## 2024-10-21 ENCOUNTER — OFFICE VISIT (OUTPATIENT)
Dept: FAMILY MEDICINE CLINIC | Age: 29
End: 2024-10-21
Payer: COMMERCIAL

## 2024-10-21 VITALS
WEIGHT: 193 LBS | DIASTOLIC BLOOD PRESSURE: 76 MMHG | SYSTOLIC BLOOD PRESSURE: 124 MMHG | BODY MASS INDEX: 26.92 KG/M2 | TEMPERATURE: 99 F | HEART RATE: 64 BPM | OXYGEN SATURATION: 99 %

## 2024-10-21 DIAGNOSIS — R31.9 HEMATURIA, UNSPECIFIED TYPE: Primary | ICD-10-CM

## 2024-10-21 DIAGNOSIS — R10.9 FLANK PAIN: ICD-10-CM

## 2024-10-21 LAB
APPEARANCE FLUID: CLEAR
BILIRUBIN, POC: NORMAL
BLOOD URINE, POC: NEGATIVE
CLARITY, POC: CLEAR
COLOR, POC: YELLOW
GLUCOSE URINE, POC: NORMAL MG/DL
KETONES, POC: NORMAL MG/DL
LEUKOCYTE EST, POC: NORMAL
NITRITE, POC: NORMAL
PH, POC: 7
PROTEIN, POC: NORMAL MG/DL
SPECIFIC GRAVITY, POC: 1.01
UROBILINOGEN, POC: 0.2 MG/DL

## 2024-10-21 PROCEDURE — 81002 URINALYSIS NONAUTO W/O SCOPE: CPT | Performed by: NURSE PRACTITIONER

## 2024-10-21 PROCEDURE — 99214 OFFICE O/P EST MOD 30 MIN: CPT | Performed by: NURSE PRACTITIONER

## 2024-10-21 ASSESSMENT — ENCOUNTER SYMPTOMS
ABDOMINAL PAIN: 0
DIARRHEA: 0
VOMITING: 0
SHORTNESS OF BREATH: 0
WHEEZING: 0
NAUSEA: 0
COUGH: 0
BACK PAIN: 1

## 2024-10-21 NOTE — PROGRESS NOTES
Juvenal Diaz (:  1995) is a 29 y.o. male,Established patient, here for evaluation of the following chief complaint(s):  Pain (Patient presents today for lower back pain and intermittent pain in groin area. He reports difficulty emptying bladder.  )         Assessment & Plan  Hematuria, unspecified type    Patient will monitor check urine routinely every 3-6 months if hematuria again will scope due to exposure at work     Orders:    POCT Urinalysis no Micro    Urinalysis; Standing    CT UROGRAM; Future    Flank pain    Increase fluids  monitor for worsening  If returns will need cystoscope  Orders:    Urinalysis; Standing    CT UROGRAM; Future      Return if symptoms worsen or fail to improve.       Subjective   HPI    Patient is here for hematuria  Reports he went to urology for follow up  ( wasn't even told about it)  Works at chemical   Does use chew tobacco products)    Reports he notes right lower back   Across back area  pain   He notes has been present a couple of months  But with history of fracture wondered if was arthritis    Reports he did note a weak urine stream for a few days  Has returned to normal   Was also urine frequency but back to normal today  The groin pain also resolved not present today      Review of Systems   Constitutional:  Negative for activity change, appetite change, fatigue and fever.   Respiratory:  Negative for cough, shortness of breath and wheezing.    Gastrointestinal:  Negative for abdominal pain, diarrhea, nausea and vomiting.   Genitourinary:  Positive for difficulty urinating (last two days), flank pain (into groin over weekend), hematuria (denies any symptoms other than at urology) and urgency (last two days). Negative for scrotal swelling and testicular pain.   Musculoskeletal:  Positive for back pain (chronic lower back ache).          Objective   Physical Exam  Vitals reviewed.   Constitutional:       General: He is not in acute distress.     Appearance:

## 2024-10-23 ENCOUNTER — HOSPITAL ENCOUNTER (OUTPATIENT)
Dept: GENERAL RADIOLOGY | Age: 29
Discharge: HOME | End: 2024-10-23
Payer: COMMERCIAL

## 2024-10-23 DIAGNOSIS — R10.9 FLANK PAIN: ICD-10-CM

## 2024-10-23 DIAGNOSIS — R31.9 HEMATURIA, UNSPECIFIED TYPE: ICD-10-CM

## 2024-10-23 LAB
BILIRUB UR QL STRIP: NEGATIVE
CLARITY UR: CLEAR
COLOR UR: YELLOW
GLUCOSE UR STRIP.AUTO-MCNC: NEGATIVE MG/DL
HGB UR STRIP.AUTO-MCNC: NEGATIVE MG/L
KETONES UR STRIP.AUTO-MCNC: NEGATIVE MG/DL
LEUKOCYTE ESTERASE UR QL STRIP.AUTO: NEGATIVE
NITRITE UR QL STRIP.AUTO: NEGATIVE
PH UR STRIP.AUTO: 6.5 [PH] (ref 5–8)
PROT UR STRIP.AUTO-MCNC: NEGATIVE MG/DL
SP GR UR STRIP.AUTO: 1.02 (ref 1–1.03)
UROBILINOGEN UR STRIP.AUTO-MCNC: 0.2 E.U./DL

## 2024-10-23 PROCEDURE — 6360000004 HC RX CONTRAST MEDICATION: Performed by: NURSE PRACTITIONER

## 2024-10-23 PROCEDURE — 74178 CT ABD&PLV WO CNTR FLWD CNTR: CPT | Performed by: NURSE PRACTITIONER

## 2024-10-23 RX ORDER — IOPAMIDOL 755 MG/ML
100 INJECTION, SOLUTION INTRAVASCULAR
Status: COMPLETED | OUTPATIENT
Start: 2024-10-23 | End: 2024-10-23

## 2024-10-23 RX ADMIN — IOPAMIDOL 75 ML: 755 INJECTION, SOLUTION INTRAVENOUS at 11:32

## 2024-10-24 ENCOUNTER — TELEPHONE (OUTPATIENT)
Dept: FAMILY MEDICINE CLINIC | Age: 29
End: 2024-10-24

## 2024-10-24 NOTE — TELEPHONE ENCOUNTER
----- Message from GIANCARLO Schrader sent at 10/24/2024 11:39 AM CDT -----  Urine culture negative pending final result

## 2024-10-24 NOTE — TELEPHONE ENCOUNTER
----- Message from GIANCARLO Schrader sent at 10/23/2024  2:15 PM CDT -----  Ct urogram normal no stones noted or issues

## 2024-10-24 NOTE — TELEPHONE ENCOUNTER
----- Message from GIANCARLO Schrader sent at 10/23/2024  3:07 PM CDT -----  Urine normal no blood or bacteria noted

## 2024-10-25 ENCOUNTER — TELEPHONE (OUTPATIENT)
Dept: FAMILY MEDICINE CLINIC | Age: 29
End: 2024-10-25

## 2024-10-25 LAB — BACTERIA UR CULT: NORMAL

## 2024-10-25 NOTE — TELEPHONE ENCOUNTER
----- Message from GIANCARLO Schrader sent at 10/25/2024  9:31 AM CDT -----  No bacteria noted at 48 hours

## 2024-10-28 DIAGNOSIS — N41.0 ACUTE PROSTATITIS: Primary | ICD-10-CM

## 2024-10-28 DIAGNOSIS — M54.42 ACUTE RIGHT-SIDED LOW BACK PAIN WITH BILATERAL SCIATICA: ICD-10-CM

## 2024-10-28 DIAGNOSIS — M54.41 ACUTE RIGHT-SIDED LOW BACK PAIN WITH BILATERAL SCIATICA: ICD-10-CM

## 2024-10-28 DIAGNOSIS — R30.0 DYSURIA: ICD-10-CM

## 2024-10-28 DIAGNOSIS — R29.898 LEG WEAKNESS, BILATERAL: ICD-10-CM

## 2024-10-28 DIAGNOSIS — M54.50 LUMBAR PAIN: Primary | ICD-10-CM

## 2024-10-28 DIAGNOSIS — R19.8 RECTAL PRESSURE: ICD-10-CM

## 2024-10-28 RX ORDER — LEVOFLOXACIN 500 MG/1
500 TABLET, FILM COATED ORAL DAILY
Qty: 14 TABLET | Refills: 0 | Status: SHIPPED | OUTPATIENT
Start: 2024-10-28 | End: 2024-11-11

## 2024-10-28 NOTE — PROGRESS NOTES
Patient having lower back pain to rectum   With issues bowel and bladder use decreased sensation   Pain better with standing   Worse in am evaluate for narrowing

## 2024-11-14 ENCOUNTER — TELEPHONE (OUTPATIENT)
Dept: FAMILY MEDICINE CLINIC | Age: 29
End: 2024-11-14

## 2024-11-14 NOTE — TELEPHONE ENCOUNTER
Valencia MONDRAGON requested Pre-cert be called for pt MRI Lumbar Spine is scheduled at [facility] for Monday November 18th at 4 pm

## 2024-11-14 NOTE — TELEPHONE ENCOUNTER
Called patient to make him aware that MRI Lumbar Spine is scheduled at [facility] for Monday November 18th at 4 pm as requested. He voiced understanding.

## 2024-11-19 ENCOUNTER — TELEPHONE (OUTPATIENT)
Dept: FAMILY MEDICINE CLINIC | Age: 29
End: 2024-11-19

## 2024-11-19 DIAGNOSIS — M54.42 ACUTE RIGHT-SIDED LOW BACK PAIN WITH BILATERAL SCIATICA: ICD-10-CM

## 2024-11-19 DIAGNOSIS — M54.41 ACUTE RIGHT-SIDED LOW BACK PAIN WITH BILATERAL SCIATICA: ICD-10-CM

## 2024-11-19 DIAGNOSIS — M43.06 PARS DEFECT OF LUMBAR SPINE: Primary | ICD-10-CM

## 2024-11-19 DIAGNOSIS — M54.50 BACK PAIN AT L4-L5 LEVEL: ICD-10-CM

## 2024-11-19 DIAGNOSIS — R19.8 RECTAL PRESSURE: ICD-10-CM

## 2024-11-19 DIAGNOSIS — R29.898 LEG WEAKNESS, BILATERAL: ICD-10-CM

## 2024-11-19 DIAGNOSIS — M54.50 LUMBAR PAIN: ICD-10-CM

## 2024-11-19 DIAGNOSIS — R30.0 DYSURIA: ICD-10-CM

## 2024-11-19 NOTE — TELEPHONE ENCOUNTER
Patient has been notified with results and recommendations. He would like to see Dr García at [facility]. Have placed orders for STAT flex and ext lumbar spine xrays and referral.

## 2024-11-19 NOTE — TELEPHONE ENCOUNTER
----- Message from GIANCARLO Schrader sent at 11/19/2024  3:36 PM CST -----  MRI lumbar spine notes the chronic bilateral L5 pars defect  Noted very minimal spondylolistheses at L5- S1  No narrowing   If back still bothersome and leg suggest PT to evaluate and treat

## 2024-11-26 ENCOUNTER — TELEPHONE (OUTPATIENT)
Dept: FAMILY MEDICINE CLINIC | Age: 29
End: 2024-11-26

## 2024-11-26 DIAGNOSIS — N45.1 EPIDIDYMITIS: ICD-10-CM

## 2024-11-26 DIAGNOSIS — R74.8 ELEVATED LIPASE: ICD-10-CM

## 2024-11-26 DIAGNOSIS — R31.9 HEMATURIA, UNSPECIFIED TYPE: ICD-10-CM

## 2024-11-26 DIAGNOSIS — R74.8 ELEVATED LIVER ENZYMES: Primary | ICD-10-CM

## 2024-11-26 DIAGNOSIS — I86.1 VARICOCELE: ICD-10-CM

## 2024-11-26 DIAGNOSIS — R74.8 ELEVATED LIVER ENZYMES: ICD-10-CM

## 2024-11-26 DIAGNOSIS — E78.5 ELEVATED LIPIDS: ICD-10-CM

## 2024-11-26 LAB
ALBUMIN SERPL-MCNC: 4.6 G/DL (ref 3.5–5.2)
ALP SERPL-CCNC: 49 U/L (ref 40–129)
ALT SERPL-CCNC: 69 U/L (ref 5–41)
ANION GAP SERPL CALCULATED.3IONS-SCNC: 13 MMOL/L (ref 7–19)
AST SERPL-CCNC: 32 U/L (ref 5–40)
BASOPHILS # BLD: 0 K/UL (ref 0–0.2)
BASOPHILS NFR BLD: 0.6 % (ref 0–1)
BILIRUB SERPL-MCNC: 1.8 MG/DL (ref 0.2–1.2)
BILIRUB UR QL STRIP: NEGATIVE
BUN SERPL-MCNC: 15 MG/DL (ref 6–20)
CALCIUM SERPL-MCNC: 9.3 MG/DL (ref 8.6–10)
CHLORIDE SERPL-SCNC: 104 MMOL/L (ref 98–111)
CHOLEST SERPL-MCNC: 177 MG/DL (ref 0–199)
CLARITY UR: CLEAR
CO2 SERPL-SCNC: 23 MMOL/L (ref 22–29)
COLOR UR: YELLOW
CREAT SERPL-MCNC: 0.9 MG/DL (ref 0.7–1.2)
EOSINOPHIL # BLD: 0 K/UL (ref 0–0.6)
EOSINOPHIL NFR BLD: 0.6 % (ref 0–5)
ERYTHROCYTE [DISTWIDTH] IN BLOOD BY AUTOMATED COUNT: 11.3 % (ref 11.5–14.5)
GLUCOSE SERPL-MCNC: 75 MG/DL (ref 70–99)
GLUCOSE UR STRIP.AUTO-MCNC: NEGATIVE MG/DL
HCT VFR BLD AUTO: 43.9 % (ref 42–52)
HDLC SERPL-MCNC: 48 MG/DL (ref 40–60)
HGB BLD-MCNC: 14.2 G/DL (ref 14–18)
HGB UR STRIP.AUTO-MCNC: NEGATIVE MG/L
IMM GRANULOCYTES # BLD: 0 K/UL
KETONES UR STRIP.AUTO-MCNC: NEGATIVE MG/DL
LDLC SERPL CALC-MCNC: 118 MG/DL
LEUKOCYTE ESTERASE UR QL STRIP.AUTO: NEGATIVE
LYMPHOCYTES # BLD: 1.6 K/UL (ref 1.1–4.5)
LYMPHOCYTES NFR BLD: 22.1 % (ref 20–40)
MCH RBC QN AUTO: 31.8 PG (ref 27–31)
MCHC RBC AUTO-ENTMCNC: 32.3 G/DL (ref 33–37)
MCV RBC AUTO: 98.4 FL (ref 80–94)
MONOCYTES # BLD: 0.5 K/UL (ref 0–0.9)
MONOCYTES NFR BLD: 6.9 % (ref 0–10)
NEUTROPHILS # BLD: 5 K/UL (ref 1.5–7.5)
NEUTS SEG NFR BLD: 69.5 % (ref 50–65)
NITRITE UR QL STRIP.AUTO: NEGATIVE
PH UR STRIP.AUTO: 7.5 [PH] (ref 5–8)
PLATELET # BLD AUTO: 225 K/UL (ref 130–400)
PMV BLD AUTO: 9.8 FL (ref 9.4–12.4)
POTASSIUM SERPL-SCNC: 4.2 MMOL/L (ref 3.5–5)
PROT SERPL-MCNC: 7.1 G/DL (ref 6.4–8.3)
PROT UR STRIP.AUTO-MCNC: NEGATIVE MG/DL
RBC # BLD AUTO: 4.46 M/UL (ref 4.7–6.1)
SODIUM SERPL-SCNC: 140 MMOL/L (ref 136–145)
SP GR UR STRIP.AUTO: 1.01 (ref 1–1.03)
TRIGL SERPL-MCNC: 55 MG/DL (ref 0–149)
UROBILINOGEN UR STRIP.AUTO-MCNC: 0.2 E.U./DL
WBC # BLD AUTO: 7.2 K/UL (ref 4.8–10.8)

## 2024-11-27 ENCOUNTER — TELEPHONE (OUTPATIENT)
Dept: FAMILY MEDICINE CLINIC | Age: 29
End: 2024-11-27

## 2024-11-27 NOTE — TELEPHONE ENCOUNTER
----- Message from Dr. RENO Cummings DO sent at 11/27/2024 12:52 PM CST -----  Your metabolic profile is normal.  This includes kidney and liver functions as well as electrolytes.  Mild elevation of liver enzyme, similar to past values  This could be caused by a number of factors.  Medications, including supplements, alcohol, Tylenol, anti-inflammatories, etc. most recent ultrasound of the liver, (5/13/2024) showed no gross structural abnormalities of the liver.  Lipids are elevated.  This is within range that you may build to get this down with diet and exercise.  Recommend low-fat low-cholesterol diet and increase exercise as tolerated.    Your WBC, (infection fighting ability) Hgb and Hct, (oxygen carrying cells) are normal; as is your percentage of each cell type.  Urinalysis is normal.  No blood in urine

## 2024-12-06 ENCOUNTER — TELEPHONE (OUTPATIENT)
Dept: FAMILY MEDICINE CLINIC | Age: 29
End: 2024-12-06

## 2024-12-06 DIAGNOSIS — H10.023 PINK EYE DISEASE OF BOTH EYES: Primary | ICD-10-CM

## 2024-12-06 RX ORDER — TOBRAMYCIN 3 MG/ML
1 SOLUTION/ DROPS OPHTHALMIC EVERY 4 HOURS
Qty: 10 ML | Refills: 0 | Status: SHIPPED | OUTPATIENT
Start: 2024-12-06 | End: 2024-12-16

## 2024-12-06 NOTE — TELEPHONE ENCOUNTER
Patient called. Reports he has pink eye in both eyes and needing medication sent in for this.    Will send to provider.

## 2024-12-30 DIAGNOSIS — N50.819 PAIN IN TESTICLE, UNSPECIFIED LATERALITY: Primary | ICD-10-CM

## 2024-12-30 DIAGNOSIS — Z30.09 STERILIZATION CONSULT: ICD-10-CM

## 2025-01-15 DIAGNOSIS — N50.819 PAIN IN TESTICLE, UNSPECIFIED LATERALITY: Primary | ICD-10-CM

## 2025-01-21 ENCOUNTER — TELEPHONE (OUTPATIENT)
Age: 30
End: 2025-01-21

## 2025-01-21 DIAGNOSIS — R31.9 HEMATURIA, UNSPECIFIED TYPE: ICD-10-CM

## 2025-01-21 DIAGNOSIS — R10.9 FLANK PAIN: ICD-10-CM

## 2025-01-21 DIAGNOSIS — R31.9 HEMATURIA, UNSPECIFIED TYPE: Primary | ICD-10-CM

## 2025-01-21 LAB
BILIRUB UR QL STRIP: NEGATIVE
CLARITY UR: CLEAR
COLOR UR: YELLOW
GLUCOSE UR STRIP.AUTO-MCNC: NEGATIVE MG/DL
HGB UR STRIP.AUTO-MCNC: NEGATIVE MG/L
KETONES UR STRIP.AUTO-MCNC: NEGATIVE MG/DL
LEUKOCYTE ESTERASE UR QL STRIP.AUTO: NEGATIVE
NITRITE UR QL STRIP.AUTO: NEGATIVE
PH UR STRIP.AUTO: 7.5 [PH] (ref 5–8)
PROT UR STRIP.AUTO-MCNC: NEGATIVE MG/DL
SP GR UR STRIP.AUTO: 1 (ref 1–1.03)
UROBILINOGEN UR STRIP.AUTO-MCNC: 0.2 E.U./DL

## 2025-01-23 ENCOUNTER — TELEPHONE (OUTPATIENT)
Age: 30
End: 2025-01-23

## 2025-01-23 LAB — BACTERIA UR CULT: NORMAL

## 2025-01-23 NOTE — TELEPHONE ENCOUNTER
----- Message from Dr. RENO Cummings DO sent at 1/22/2025  8:03 PM CST -----  Urine culture shows no growth (normal)

## 2025-01-24 ENCOUNTER — TELEPHONE (OUTPATIENT)
Age: 30
End: 2025-01-24

## 2025-01-24 NOTE — TELEPHONE ENCOUNTER
----- Message from GIANCARLO Schrader sent at 1/24/2025  3:07 PM CST -----  Urine culture negative

## 2025-02-06 ENCOUNTER — TELEPHONE (OUTPATIENT)
Age: 30
End: 2025-02-06

## 2025-02-06 NOTE — TELEPHONE ENCOUNTER
[facility] Scan appt was made for Wednesday, 2/12/25, at 8:00 with an arrival time of 7:45.    Left patient a voicemail to call [facility] Scheduling with the number if he needed to change the appointment.

## 2025-02-12 ENCOUNTER — TELEMEDICINE (OUTPATIENT)
Age: 30
End: 2025-02-12
Payer: COMMERCIAL

## 2025-02-12 DIAGNOSIS — R79.89 LOW TESTOSTERONE IN MALE: Primary | ICD-10-CM

## 2025-02-12 DIAGNOSIS — E29.1 HYPOGONADISM IN MALE: ICD-10-CM

## 2025-02-12 DIAGNOSIS — R74.8 ELEVATED LIVER ENZYMES: ICD-10-CM

## 2025-02-12 DIAGNOSIS — R10.11 RIGHT UPPER QUADRANT PAIN: ICD-10-CM

## 2025-02-12 PROCEDURE — 99213 OFFICE O/P EST LOW 20 MIN: CPT | Performed by: PEDIATRICS

## 2025-02-12 RX ORDER — TESTOSTERONE CYPIONATE 200 MG/ML
200 INJECTION, SOLUTION INTRAMUSCULAR
Qty: 6 ML | Refills: 1 | Status: SHIPPED | OUTPATIENT
Start: 2025-02-12 | End: 2025-07-17

## 2025-02-12 ASSESSMENT — ENCOUNTER SYMPTOMS
EYES NEGATIVE: 1
ALLERGIC/IMMUNOLOGIC NEGATIVE: 1
GASTROINTESTINAL NEGATIVE: 1
RESPIRATORY NEGATIVE: 1

## 2025-02-12 NOTE — PROGRESS NOTES
11/26/2024 0.2     Nitrite, Urine 11/26/2024 Negative     Leukocyte Esterase, Urine 11/26/2024 Negative     WBC 11/26/2024 7.2     RBC 11/26/2024 4.46 (L)     Hemoglobin 11/26/2024 14.2     Hematocrit 11/26/2024 43.9     MCV 11/26/2024 98.4 (H)     MCH 11/26/2024 31.8 (H)     MCHC 11/26/2024 32.3 (L)     RDW 11/26/2024 11.3 (L)     Platelets 11/26/2024 225     MPV 11/26/2024 9.8     Neutrophils % 11/26/2024 69.5 (H)     Lymphocytes % 11/26/2024 22.1     Monocytes % 11/26/2024 6.9     Eosinophils % 11/26/2024 0.6     Basophils % 11/26/2024 0.6     Neutrophils Absolute 11/26/2024 5.0     Immature Granulocytes # 11/26/2024 0.0     Lymphocytes Absolute 11/26/2024 1.6     Monocytes Absolute 11/26/2024 0.50     Eosinophils Absolute 11/26/2024 0.00     Basophils Absolute 11/26/2024 0.00     Sodium 11/26/2024 140     Potassium 11/26/2024 4.2     Chloride 11/26/2024 104     CO2 11/26/2024 23     Anion Gap 11/26/2024 13     Glucose 11/26/2024 75     BUN 11/26/2024 15     Creatinine 11/26/2024 0.9     Est, Glom Filt Rate 11/26/2024 >90     Calcium 11/26/2024 9.3     Total Protein 11/26/2024 7.1     Albumin 11/26/2024 4.6     Total Bilirubin 11/26/2024 1.8 (H)     Alkaline Phosphatase 11/26/2024 49     ALT 11/26/2024 69 (H)     AST 11/26/2024 32     Cholesterol, Total 11/26/2024 177     Triglycerides 11/26/2024 55     HDL 11/26/2024 48     LDL Cholesterol 11/26/2024 118 (H)    Orders Only on 10/23/2024   Component Date Value    Urine Culture, Routine 10/23/2024 No growth at 36-48 hours     Color, UA 10/23/2024 YELLOW     Clarity, UA 10/23/2024 Clear     Glucose, Ur 10/23/2024 Negative     Bilirubin, Urine 10/23/2024 Negative     Ketones, Urine 10/23/2024 Negative     Specific Gravity, UA 10/23/2024 1.017     Blood, Urine 10/23/2024 Negative     pH, Urine 10/23/2024 6.5     Protein, UA 10/23/2024 Negative     Urobilinogen, Urine 10/23/2024 0.2     Nitrite, Urine 10/23/2024 Negative     Leukocyte Esterase, Urine 10/23/2024

## 2025-02-13 ENCOUNTER — TELEPHONE (OUTPATIENT)
Age: 30
End: 2025-02-13

## 2025-02-21 ENCOUNTER — TELEPHONE (OUTPATIENT)
Age: 30
End: 2025-02-21

## 2025-02-21 DIAGNOSIS — R74.8 ELEVATED LIVER ENZYMES: Primary | ICD-10-CM

## 2025-02-21 DIAGNOSIS — R79.89 LOW TESTOSTERONE IN MALE: ICD-10-CM

## 2025-02-21 DIAGNOSIS — R10.9 ABDOMINAL PAIN, UNSPECIFIED ABDOMINAL LOCATION: ICD-10-CM

## 2025-02-21 DIAGNOSIS — R74.8 ELEVATED LIVER ENZYMES: ICD-10-CM

## 2025-02-21 LAB
ALBUMIN SERPL-MCNC: 4.7 G/DL (ref 3.5–5.2)
ALP SERPL-CCNC: 53 U/L (ref 40–129)
ALT SERPL-CCNC: 42 U/L (ref 5–41)
ANION GAP SERPL CALCULATED.3IONS-SCNC: 12 MMOL/L (ref 8–16)
AST SERPL-CCNC: 21 U/L (ref 5–40)
BASOPHILS # BLD: 0.1 K/UL (ref 0–0.2)
BASOPHILS NFR BLD: 1 % (ref 0–1)
BILIRUB DIRECT SERPL-MCNC: 0.1 MG/DL (ref 0–0.3)
BILIRUB INDIRECT SERPL-MCNC: 0.4 MG/DL (ref 0–1)
BILIRUB SERPL-MCNC: 0.5 MG/DL (ref 0.2–1.2)
BUN SERPL-MCNC: 12 MG/DL (ref 6–20)
CALCIUM SERPL-MCNC: 9.1 MG/DL (ref 8.6–10)
CHLORIDE SERPL-SCNC: 101 MMOL/L (ref 98–107)
CO2 SERPL-SCNC: 27 MMOL/L (ref 22–29)
CREAT SERPL-MCNC: 1 MG/DL (ref 0.7–1.2)
CRP SERPL HS-MCNC: <0.3 MG/DL (ref 0–0.5)
EOSINOPHIL # BLD: 0.1 K/UL (ref 0–0.6)
EOSINOPHIL NFR BLD: 1.9 % (ref 0–5)
ERYTHROCYTE [DISTWIDTH] IN BLOOD BY AUTOMATED COUNT: 11.8 % (ref 11.5–14.5)
ERYTHROCYTE [SEDIMENTATION RATE] IN BLOOD BY WESTERGREN METHOD: 5 MM/HR (ref 0–10)
ETHANOLAMINE SERPL-MCNC: <10 MG/DL (ref 0–10)
FERRITIN SERPL-MCNC: 157.1 NG/ML (ref 30–400)
GLUCOSE SERPL-MCNC: 82 MG/DL (ref 70–99)
HAV IGM SERPL QL IA: NORMAL
HBV CORE IGM SERPL QL IA: NORMAL
HBV SURFACE AB SERPL IA-ACNC: NORMAL M[IU]/ML
HBV SURFACE AG SERPL QL IA: NORMAL
HCT VFR BLD AUTO: 42 % (ref 42–52)
HCV AB SERPL QL IA: NORMAL
HGB BLD-MCNC: 14.4 G/DL (ref 14–18)
HIV-1 P24 AG: NORMAL
HIV1+2 AB SERPLBLD QL IA.RAPID: NORMAL
IMM GRANULOCYTES # BLD: 0 K/UL
IRON SERPL-MCNC: 75 UG/DL (ref 59–158)
LYMPHOCYTES # BLD: 1.7 K/UL (ref 1.1–4.5)
LYMPHOCYTES NFR BLD: 33.4 % (ref 20–40)
MCH RBC QN AUTO: 32.3 PG (ref 27–31)
MCHC RBC AUTO-ENTMCNC: 34.3 G/DL (ref 33–37)
MCV RBC AUTO: 94.2 FL (ref 80–94)
MONOCYTES # BLD: 0.5 K/UL (ref 0–0.9)
MONOCYTES NFR BLD: 9.7 % (ref 0–10)
NEUTROPHILS # BLD: 2.8 K/UL (ref 1.5–7.5)
NEUTS SEG NFR BLD: 53.6 % (ref 50–65)
PLATELET # BLD AUTO: 293 K/UL (ref 130–400)
PMV BLD AUTO: 9.6 FL (ref 9.4–12.4)
POTASSIUM SERPL-SCNC: 3.6 MMOL/L (ref 3.5–5.1)
PROT SERPL-MCNC: 7.1 G/DL (ref 6.4–8.3)
PSA SERPL-MCNC: 0.5 NG/ML (ref 0–4)
RBC # BLD AUTO: 4.46 M/UL (ref 4.7–6.1)
SODIUM SERPL-SCNC: 140 MMOL/L (ref 136–145)
WBC # BLD AUTO: 5.2 K/UL (ref 4.8–10.8)

## 2025-02-21 NOTE — TELEPHONE ENCOUNTER
Received call from pt c/o pain in right upper abdominal area still. He is coming in for labs later this morning and requesting additional labs to check his liver.     Spoke with Dr Cummings about this and will enter labs for pt.

## 2025-02-23 LAB
A1AT SERPL-MCNC: 125 MG/DL (ref 90–200)
HAV AB SER QL IA: NEGATIVE

## 2025-02-24 LAB
COPPER SERPL-MCNC: 83.8 UG/DL (ref 70–140)
HCV RNA SERPL NAA+PROBE-ACNC: NOT DETECTED IU/ML
HCV RNA SERPL NAA+PROBE-LOG IU: NOT DETECTED LOG IU/ML
HCV RNA SERPL QL NAA+PROBE: NOT DETECTED
NUCLEAR IGG SER QL IA: NORMAL

## 2025-02-25 ENCOUNTER — TELEPHONE (OUTPATIENT)
Age: 30
End: 2025-02-25

## 2025-02-25 LAB
CERULOPLASMIN SERPL-MCNC: 21 MG/DL (ref 15–30)
SHBG SERPL-SCNC: 23 NMOL/L (ref 10–60)
SHBG SERPL-SCNC: 395.4 PG/ML (ref 47–244)
TESTOST SERPL-MCNC: 1310 NG/DL (ref 249–836)

## 2025-02-25 NOTE — TELEPHONE ENCOUNTER
----- Message from Dr. RENO Cummings DO sent at 2/25/2025  1:58 PM CST -----  Testosterone is very high.  Recommend decreasing the dose to 0.7 mL IM q. 14 days.  Recheck testosterone free and total in 2-3 months.

## 2025-03-18 DIAGNOSIS — T28.1XXA BURN OF ESOPHAGUS, INITIAL ENCOUNTER: ICD-10-CM

## 2025-03-18 DIAGNOSIS — R10.9 ABDOMINAL PAIN, UNSPECIFIED ABDOMINAL LOCATION: Primary | ICD-10-CM

## 2025-03-18 RX ORDER — SUCRALFATE ORAL 1 G/10ML
1 SUSPENSION ORAL
Qty: 414 ML | Refills: 0 | Status: CANCELLED | OUTPATIENT
Start: 2025-03-18 | End: 2025-03-28

## 2025-03-18 RX ORDER — SUCRALFATE 1 G/1
1 TABLET ORAL 4 TIMES DAILY
Qty: 40 TABLET | Refills: 0 | Status: SHIPPED | OUTPATIENT
Start: 2025-03-18 | End: 2025-03-28

## 2025-03-18 NOTE — TELEPHONE ENCOUNTER
Will send urgent referral to GI.     Will pend medication to provider. Suspension is not on his formulary with insurance. Spoke to pharmacist and the tablets dissolve in water easily.     Requested Prescriptions     Pending Prescriptions Disp Refills    sucralfate (CARAFATE) 1 GM tablet 120 tablet 3     Sig: Take 1 tablet by mouth 4 times daily

## 2025-03-18 NOTE — TELEPHONE ENCOUNTER
Received call from pt requesting a referral to Gastroenterology. Pt states he cannot eat or drink anything without his stomach hurting and burning. Pt is afraid he may have an ulcer. He is also wanting to see if we can send rx for carafate to pharmacy. He currently takes omeprazole daily but this is no longer working for pt.     Will send to provider for recommendations.

## 2025-03-21 ENCOUNTER — OFFICE VISIT (OUTPATIENT)
Dept: GASTROENTEROLOGY | Age: 30
End: 2025-03-21
Payer: COMMERCIAL

## 2025-03-21 VITALS
HEIGHT: 71 IN | SYSTOLIC BLOOD PRESSURE: 120 MMHG | OXYGEN SATURATION: 99 % | HEART RATE: 62 BPM | WEIGHT: 205 LBS | BODY MASS INDEX: 28.7 KG/M2 | DIASTOLIC BLOOD PRESSURE: 80 MMHG

## 2025-03-21 DIAGNOSIS — K21.9 GASTROESOPHAGEAL REFLUX DISEASE, UNSPECIFIED WHETHER ESOPHAGITIS PRESENT: Primary | ICD-10-CM

## 2025-03-21 DIAGNOSIS — R10.11 RUQ ABDOMINAL PAIN: ICD-10-CM

## 2025-03-21 DIAGNOSIS — R10.13 EPIGASTRIC PAIN: ICD-10-CM

## 2025-03-21 PROCEDURE — 99214 OFFICE O/P EST MOD 30 MIN: CPT | Performed by: NURSE PRACTITIONER

## 2025-03-21 RX ORDER — PANTOPRAZOLE SODIUM 40 MG/1
40 TABLET, DELAYED RELEASE ORAL 2 TIMES DAILY
Qty: 60 TABLET | Refills: 3 | Status: SHIPPED | OUTPATIENT
Start: 2025-03-21

## 2025-03-21 NOTE — PROGRESS NOTES
Subjective:     Patient ID: Juvenal Diaz is a 30 y.o. male  PCP: RENO Cummings DO  Referring Provider: Valencia Juarez APRN    HPI  Patient presents to the office today with the following complaints: New Patient and Abdominal Pain      Patient seen in the office today with complaints of RUQ and epigastric pain for the past 1 year   He has chronic GERD and this has been increased lately as well  Epigastric pain increases with eating   He is taking omeprazole 40 mg daily and this is not controlling his acid reflux  We will change this to protonix 40 mg BID and add carafate as well     He had a Gallbladder workup this was negative    Assessment:     1. Gastroesophageal reflux disease, unspecified whether esophagitis present  -     pantoprazole (PROTONIX) 40 MG tablet; Take 1 tablet by mouth 2 times daily at 0800 and 1400, Disp-60 tablet, R-3Normal  2. RUQ abdominal pain  3. Epigastric pain       Review of Systems   Constitutional:  Negative for activity change, appetite change, fatigue, fever and unexpected weight change.   HENT:  Negative for trouble swallowing.    Respiratory:  Negative for cough, choking and shortness of breath.    Cardiovascular:  Negative for chest pain.   Gastrointestinal:  Positive for abdominal pain. Negative for abdominal distention, anal bleeding, blood in stool, constipation, diarrhea, nausea, rectal pain and vomiting.        GERD   Allergic/Immunologic: Negative for food allergies.   All other systems reviewed and are negative.      Plan:   Stop omeprazole  Start protonix 40 m,g BID RX provided  Start carafate QID RX provided     Orders  No orders of the defined types were placed in this encounter.    Medications  Orders Placed This Encounter   Medications    pantoprazole (PROTONIX) 40 MG tablet     Sig: Take 1 tablet by mouth 2 times daily at 0800 and 1400     Dispense:  60 tablet     Refill:  3         Patient History:     Past Medical History:   Diagnosis Date    Asthma

## 2025-03-27 ASSESSMENT — ENCOUNTER SYMPTOMS
COUGH: 0
NAUSEA: 0
ABDOMINAL DISTENTION: 0
BLOOD IN STOOL: 0
RECTAL PAIN: 0
SHORTNESS OF BREATH: 0
ABDOMINAL PAIN: 1
CHOKING: 0
TROUBLE SWALLOWING: 0
VOMITING: 0
ANAL BLEEDING: 0
DIARRHEA: 0
CONSTIPATION: 0

## 2025-04-01 ENCOUNTER — ANESTHESIA EVENT (OUTPATIENT)
Dept: OPERATING ROOM | Age: 30
End: 2025-04-01

## 2025-04-01 ENCOUNTER — ANESTHESIA (OUTPATIENT)
Dept: OPERATING ROOM | Age: 30
End: 2025-04-01

## 2025-04-01 ENCOUNTER — HOSPITAL ENCOUNTER (OUTPATIENT)
Age: 30
Setting detail: OUTPATIENT SURGERY
Discharge: HOME | End: 2025-04-01
Attending: INTERNAL MEDICINE | Admitting: INTERNAL MEDICINE
Payer: COMMERCIAL

## 2025-04-01 ENCOUNTER — TELEPHONE (OUTPATIENT)
Age: 30
End: 2025-04-01

## 2025-04-01 ENCOUNTER — HOSPITAL ENCOUNTER (OUTPATIENT)
Age: 30
Setting detail: SPECIMEN
Discharge: HOME | End: 2025-04-01
Payer: COMMERCIAL

## 2025-04-01 ENCOUNTER — APPOINTMENT (OUTPATIENT)
Dept: OPERATING ROOM | Age: 30
End: 2025-04-01
Attending: INTERNAL MEDICINE

## 2025-04-01 VITALS
OXYGEN SATURATION: 99 % | WEIGHT: 195 LBS | SYSTOLIC BLOOD PRESSURE: 115 MMHG | HEART RATE: 49 BPM | RESPIRATION RATE: 14 BRPM | HEIGHT: 71 IN | DIASTOLIC BLOOD PRESSURE: 64 MMHG | BODY MASS INDEX: 27.3 KG/M2 | TEMPERATURE: 97.6 F

## 2025-04-01 DIAGNOSIS — R10.11 RUQ PAIN: Primary | ICD-10-CM

## 2025-04-01 PROCEDURE — 43239 EGD BIOPSY SINGLE/MULTIPLE: CPT

## 2025-04-01 PROCEDURE — 43239 EGD BIOPSY SINGLE/MULTIPLE: CPT | Performed by: INTERNAL MEDICINE

## 2025-04-01 PROCEDURE — 88305 TISSUE EXAM BY PATHOLOGIST: CPT

## 2025-04-01 RX ORDER — SODIUM CHLORIDE 9 MG/ML
INJECTION, SOLUTION INTRAVENOUS CONTINUOUS
Status: DISCONTINUED | OUTPATIENT
Start: 2025-04-01 | End: 2025-04-01 | Stop reason: HOSPADM

## 2025-04-01 RX ORDER — PROPOFOL 10 MG/ML
INJECTION, EMULSION INTRAVENOUS
Status: DISCONTINUED | OUTPATIENT
Start: 2025-04-01 | End: 2025-04-01 | Stop reason: SDUPTHER

## 2025-04-01 RX ORDER — SODIUM CHLORIDE, SODIUM LACTATE, POTASSIUM CHLORIDE, CALCIUM CHLORIDE 600; 310; 30; 20 MG/100ML; MG/100ML; MG/100ML; MG/100ML
INJECTION, SOLUTION INTRAVENOUS CONTINUOUS
Status: DISCONTINUED | OUTPATIENT
Start: 2025-04-01 | End: 2025-04-01 | Stop reason: HOSPADM

## 2025-04-01 RX ORDER — LIDOCAINE HYDROCHLORIDE 10 MG/ML
INJECTION, SOLUTION INFILTRATION; PERINEURAL
Status: DISCONTINUED | OUTPATIENT
Start: 2025-04-01 | End: 2025-04-01 | Stop reason: SDUPTHER

## 2025-04-01 RX ADMIN — PROPOFOL 50 MG: 10 INJECTION, EMULSION INTRAVENOUS at 14:54

## 2025-04-01 RX ADMIN — LIDOCAINE HYDROCHLORIDE 100 MG: 10 INJECTION, SOLUTION INFILTRATION; PERINEURAL at 14:50

## 2025-04-01 RX ADMIN — LIDOCAINE HYDROCHLORIDE 100 MG: 10 INJECTION, SOLUTION INFILTRATION; PERINEURAL at 14:41

## 2025-04-01 RX ADMIN — PROPOFOL 200 MG: 10 INJECTION, EMULSION INTRAVENOUS at 14:49

## 2025-04-01 RX ADMIN — SODIUM CHLORIDE: 9 INJECTION, SOLUTION INTRAVENOUS at 13:13

## 2025-04-01 ASSESSMENT — PAIN - FUNCTIONAL ASSESSMENT
PAIN_FUNCTIONAL_ASSESSMENT: NONE - DENIES PAIN
PAIN_FUNCTIONAL_ASSESSMENT: NONE - DENIES PAIN

## 2025-04-01 NOTE — DISCHARGE INSTRUCTIONS
1.  Await path results, the patient will be contacted in 7-10 days with biopsy results.   2.  Continue medications for GERD along with anti-GERD measures  3. - Resume previous meds and diet  - GI clinic f/u 6 weeks with Ms. Romero    - Keep scheduled f/u appts with other MDs     - NO ASA/NSAIDs x 2 weeks     POST-OP ORDERS: ENDOSCOPY & COLONOSCOPY:    1. Rest today.    2. DO NOT eat or drink until wide awake; eat your usual diet today in moderate amount only.    3. DO NOT drive today.    4. Call physician if you have severe pain, vomiting, fever, rectal bleeding or black bowel movements.    5.  If a biopsy was taken or a polyp removed, you should expect to hear results in about 7-10 days.  If you have heard nothing from your physician by then, call the office for results.    6.  Discharge home when patient awake, vitals signs stable and tolerating liquids.    7. Call with questions or concerns [phone].    NSAIDS (Non-steroidal Anti-Inflammatory)    You have been directed by your physician to avoid any NSAID's; the following medications are a list of those to avoid. If you think that you are taking any NSAID's notify your physician.     Over The Counter    Advil                      Motrin  Tylenol is ok!  Nuprin                   Ibuprofen  Midol                     Aleve  Naproxen              Orudis  Aspirin                   Mary-Ludell    Prescriptions and Generics    Cataflam              Relafen  Voltaren               Clinoril  Indocin                 Naproxen  Arthrotec              Lodine  Daypro                 Nalfon  Toradol                Ansaid  Feldene               Meclofenamate  Fenoprofen          Ponstel  Mobic                   Celebrex  Vioxx

## 2025-04-01 NOTE — H&P
Patient Name: Juvenal Diaz  : 1995  MRN: [id number]  DATE: 25    Allergies:   Allergies   Allergen Reactions    Percocet [Oxycodone-Acetaminophen] Nausea And Vomiting        ENDOSCOPY  History and Physical    Procedure:    [] Diagnostic Colonoscopy       [] Screening Colonoscopy  [x] EGD      [] ERCP      [] EUS       [] Other    [x] Previous office notes/History and Physical reviewed from the patients chart. Please see EMR for further details of HPI. I have examined the patient's status immediately prior to the procedure and:      Indications/HPI:      1. Gastroesophageal reflux disease, unspecified whether esophagitis present  2. RUQ abdominal pain  3. Epigastric pain    []Abdominal Pain   []Cancer- GI/Lung     []Fhx of colon CA/polyps  []History of Polyps  []Barretts            []Melena  []Abnormal Imaging              []Dysphagia              []Persistent Pneumonia   []Anemia                            []Food Impaction        []History of Polyps  [] GI Bleed             []Pulmonary nodule/Mass   []Change in bowel habits []Heartburn/Reflux  []Rectal Bleed (BRBPR)  []Chest Pain - Non Cardiac []Heme (+) Stool []Ulcers  []Constipation  []Hemoptysis  []Varices  []Diarrhea  []Hypoxemia    []Nausea/Vomiting   []Screening   []Crohns/Colitis  []Other:     Anesthesia:   [x] MAC [] Moderate Sedation   [] General   [] None     ROS: 12 pt Review of Symptoms was negative unless mentioned above    Medications:   Prior to Admission medications    Medication Sig Start Date End Date Taking? Authorizing Provider   pantoprazole (PROTONIX) 40 MG tablet Take 1 tablet by mouth 2 times daily at 0800 and 1400 3/21/25  Yes Rigo Romero APRN   sucralfate (CARAFATE) 1 GM tablet Take 1 tablet by mouth 4 times daily for 10 days Take before meals and nightly 3/18/25 4/1/25 Yes Valencia Juarez APRN   testosterone cypionate (DEPOTESTOTERONE CYPIONATE) 200 MG/ML injection Inject 1 mL into the muscle every 14 days

## 2025-04-01 NOTE — ANESTHESIA POSTPROCEDURE EVALUATION
Department of Anesthesiology  Postprocedure Note    Patient: Juvenal Diaz  MRN: [id number]  YOB: 1995  Date of evaluation: 4/1/2025    Procedure Summary       Date: 04/01/25 Room / Location: [place] [address] / [facility]    Anesthesia Start: 1439 Anesthesia Stop:     Procedure: ESOPHAGOGASTRODUODENOSCOPY BIOPSY (Esophagus) Diagnosis:       RUQ abdominal pain      Epigastric pain      Gastroesophageal reflux disease, unspecified whether esophagitis present      (RUQ abdominal pain [R10.11])      (Epigastric pain [R10.13])      (Gastroesophageal reflux disease, unspecified whether esophagitis present [K21.9])    Surgeons: Bradford Velazquez MD Responsible Provider: Johnnie Garcia APRN - CRNA    Anesthesia Type: General, TIVA ASA Status: 1            Anesthesia Type: General, TIVA    Gertrudis Phase I: Gertrudis Score: 10    Gertrudis Phase II:      Anesthesia Post Evaluation    Patient location during evaluation: bedside  Patient participation: complete - patient participated  Level of consciousness: awake  Pain score: 0  Airway patency: patent  Nausea & Vomiting: no nausea and no vomiting  Cardiovascular status: blood pressure returned to baseline and hemodynamically stable  Respiratory status: acceptable, room air, spontaneous ventilation and nonlabored ventilation  Hydration status: euvolemic  Pain management: adequate    No notable events documented.

## 2025-04-01 NOTE — ANESTHESIA PRE PROCEDURE
Department of Anesthesiology  Preprocedure Note       Name:  Juvenal Diaz   Age:  30 y.o.  :  1995                                          MRN:  [id number]         Date:  2025      Surgeon: Surgeon(s):  Bradford Velazquez MD    Procedure: Procedure(s):  ESOPHAGOGASTRODUODENOSCOPY BIOPSY    Medications prior to admission:   Prior to Admission medications    Medication Sig Start Date End Date Taking? Authorizing Provider   pantoprazole (PROTONIX) 40 MG tablet Take 1 tablet by mouth 2 times daily at 0800 and 1400 3/21/25  Yes Rigo Romero APRN   sucralfate (CARAFATE) 1 GM tablet Take 1 tablet by mouth 4 times daily for 10 days Take before meals and nightly 3/18/25 4/1/25 Yes Valencia Juarez APRN   testosterone cypionate (DEPOTESTOTERONE CYPIONATE) 200 MG/ML injection Inject 1 mL into the muscle every 14 days for 12 doses. Max Daily Amount: 200 mg 25  RENO Cummings,    SYRINGE-NEEDLE, DISP, 3 ML 22G X 1-1/2\" 3 ML MISC Use with testosterone injections. 25   RENO Cummings DO   Hypodermic Needles-Disposable MISC Use with testosterone injections. 25   RENO Cummings DO       Current medications:    Current Facility-Administered Medications   Medication Dose Route Frequency Provider Last Rate Last Admin   • lactated ringers infusion   IntraVENous Continuous Bradford Velazquez MD       • 0.9 % sodium chloride infusion   IntraVENous Continuous Bradford Velazquez  mL/hr at 25 1313 New Bag at 25 1313       Allergies:    Allergies   Allergen Reactions   • Percocet [Oxycodone-Acetaminophen] Nausea And Vomiting       Problem List:    Patient Active Problem List   Diagnosis Code   • Arm pain, right M79.601   • Injury of shoulder, left S49.92XA       Past Medical History:        Diagnosis Date   • Asthma     Has outgrown it   • Vision abnormalities     wears glasses/contacts       Past Surgical History:        Procedure

## 2025-04-03 ENCOUNTER — RESULTS FOLLOW-UP (OUTPATIENT)
Dept: GASTROENTEROLOGY | Age: 30
End: 2025-04-03

## 2025-04-11 ENCOUNTER — OFFICE VISIT (OUTPATIENT)
Age: 30
End: 2025-04-11
Payer: COMMERCIAL

## 2025-04-11 VITALS
BODY MASS INDEX: 28.98 KG/M2 | DIASTOLIC BLOOD PRESSURE: 76 MMHG | HEART RATE: 63 BPM | SYSTOLIC BLOOD PRESSURE: 128 MMHG | WEIGHT: 207 LBS | HEIGHT: 71 IN | OXYGEN SATURATION: 97 % | TEMPERATURE: 99.7 F

## 2025-04-11 DIAGNOSIS — R10.11 RUQ PAIN: Primary | ICD-10-CM

## 2025-04-11 DIAGNOSIS — K59.09 OTHER CONSTIPATION: ICD-10-CM

## 2025-04-11 DIAGNOSIS — R74.8 ELEVATED LIVER ENZYMES: ICD-10-CM

## 2025-04-11 PROCEDURE — 99214 OFFICE O/P EST MOD 30 MIN: CPT | Performed by: NURSE PRACTITIONER

## 2025-04-11 ASSESSMENT — ENCOUNTER SYMPTOMS
ABDOMINAL PAIN: 1
BACK PAIN: 0
NAUSEA: 1

## 2025-04-11 ASSESSMENT — PATIENT HEALTH QUESTIONNAIRE - PHQ9
SUM OF ALL RESPONSES TO PHQ QUESTIONS 1-9: 0
1. LITTLE INTEREST OR PLEASURE IN DOING THINGS: NOT AT ALL
2. FEELING DOWN, DEPRESSED OR HOPELESS: NOT AT ALL

## 2025-04-11 NOTE — PROGRESS NOTES
Juvenal Diaz (:  1995) is a 30 y.o. male, Established patient, here for evaluation of the following chief complaint(s):  Follow Up Egd (Patient is here today for a follow up after most recent egd. )         Assessment & Plan  1. Epigastric and right upper quadrant pain: Persistent.  - Bilirubin levels have fluctuated, with recent results showing 1.5 and 1.8. Liver enzymes have decreased from 159 to 49-69. Discussion of potential chronic cholelithiasis despite normal test results; symptoms include pain radiating to the back and changes in stool color.  - Follow a bland diet.  - Medical records, including HIDA scan results, will be sent to Dr. Avelina Black for further evaluation on 2025.    2. Constipation: Chronic.  - Reports chronic constipation with bowel movements occurring once or twice a week.  - Incorporate MiraLAX into the daily routine, which can be mixed with water, coffee, or tea.  - Recommended dosage of MiraLAX can be adjusted as needed to improve bowel regularity.    Follow-up  - Further evaluation by Dr. Avelina Black on 2025.    Results  Labs   - Bilirubin: 1.5   - Liver enzymes: 159, now down to 49 and 69    Imaging   - HIDA scan: Normal    Diagnostic Testing   - EGD: Normal  No results found for this visit on 25.    ICD-10-CM    1. RUQ pain  R10.11 Pending surgical evaluation       2. Other constipation  K59.09 Will start miralax daily titrate for 1-2 soft bms daily      3. Elevated liver enzymes  R74.8 Consider GI evaluation of elevation liver causes if continues           Return if symptoms worsen or fail to improve.       Subjective   History of Present Illness  The patient is here for a follow-up after an EGD. He continues to experience epigastric and right upper quadrant pain despite having normal ultrasound and HIDA scan results.    Epigastric and Right Upper Quadrant Pain  - Persistent pain in the epigastric region, which radiates to the back, is reported.  -

## 2025-04-23 DIAGNOSIS — R10.11 RUQ PAIN: Primary | ICD-10-CM

## 2025-04-23 DIAGNOSIS — Z80.0 FAMILY HISTORY OF PANCREATIC CANCER: ICD-10-CM

## 2025-04-23 DIAGNOSIS — K82.0 GALLBLADDER CONTRACTION: ICD-10-CM

## 2025-04-23 RX ORDER — DICYCLOMINE HCL 20 MG
20 TABLET ORAL 4 TIMES DAILY
Qty: 90 TABLET | Refills: 5 | Status: SHIPPED | OUTPATIENT
Start: 2025-04-23

## 2025-04-23 NOTE — TELEPHONE ENCOUNTER
Patient is still having gallbladder spasms, he is scheduled to see Dr. Black 5/1 at [facility] for consult on gallbladder. He would like dicyclomine sent in the mean time, if possible.    Also, with recent news of aunt having pancreatic cancer this would make both sides of family. Grandmother, Aunt and uncle. He would like genetic testing ordered.     Requested Prescriptions     Pending Prescriptions Disp Refills    dicyclomine (BENTYL) 20 MG tablet 90 tablet 5     Sig: Take 1 tablet by mouth 4 times daily

## 2025-05-05 ENCOUNTER — TELEPHONE (OUTPATIENT)
Dept: OTHER | Age: 30
End: 2025-05-05

## 2025-05-05 NOTE — TELEPHONE ENCOUNTER
[facility] contacted me and said they were unable to reach patient to schedule mobile phlebotomy.  Contacted patient and he no longer wants testing collected.  Let him know that he does meet criteria and available any time if he changes his mind.  Pt verbalized understanding.

## 2025-05-28 RX ORDER — OMEPRAZOLE 40 MG/1
40 CAPSULE, DELAYED RELEASE ORAL DAILY
Qty: 90 CAPSULE | Refills: 3 | Status: SHIPPED | OUTPATIENT
Start: 2025-05-28

## 2025-05-28 NOTE — TELEPHONE ENCOUNTER
Received call from patient requesting to go back on the omeprazole 40 mg.  Patient was switched to pantoprazole, but this is not working for him.  Will send to provider for authorization.    Requested Prescriptions     Pending Prescriptions Disp Refills    omeprazole (PRILOSEC) 40 MG delayed release capsule 90 capsule 3     Sig: Take 1 capsule by mouth daily (needs a follow up before further refills)

## 2025-08-12 ENCOUNTER — TELEPHONE (OUTPATIENT)
Age: 30
End: 2025-08-12

## 2025-08-13 RX ORDER — ESOMEPRAZOLE MAGNESIUM 40 MG/1
40 CAPSULE, DELAYED RELEASE ORAL
Qty: 30 CAPSULE | Refills: 0 | Status: SHIPPED | OUTPATIENT
Start: 2025-08-13

## (undated) DEVICE — FORCEPS BX 240CM 2.4MM L NDL RAD JAW 4 M00513334

## (undated) DEVICE — CLEANING SPONGE: Brand: KOALA™

## (undated) DEVICE — CANNULA NSL AD L7FT DIV O2 CO2 W/ M LUERLOCK TRMPT CONN

## (undated) DEVICE — ENDO KIT,LOURDES HOSPITAL: Brand: MEDLINE INDUSTRIES, INC.

## (undated) DEVICE — ADAPTER CLEANING PORPOISE CLEANING

## (undated) DEVICE — SINGLE PORT MANIFOLD: Brand: NEPTUNE 2

## (undated) DEVICE — BRUSH ENDOSCP 2 END CHN HEDGEHOG

## (undated) DEVICE — CLEANING BRUSH - SINGLE USE: Brand: SAFEGUIDE®

## (undated) DEVICE — BITE BLOCK ENDOSCP AD 60 FR W/ ADJ STRP PLAS GRN BLOX

## (undated) DEVICE — SUPPLEMENT DIGESTIVE H2O SOL GI-EASE